# Patient Record
Sex: FEMALE | Race: WHITE | URBAN - METROPOLITAN AREA
[De-identification: names, ages, dates, MRNs, and addresses within clinical notes are randomized per-mention and may not be internally consistent; named-entity substitution may affect disease eponyms.]

---

## 2017-01-13 NOTE — PROGRESS NOTES
SUBJECTIVE:                                                    Teresita Hutton is a 31 year old female who presents to clinic today for the following health issues:      Acute Illness   Acute illness concerns: cough    Onset: 7 days     Fever: YES    Chills/Sweats: no    Headache (location?): YES    Sinus Pressure:YES    Conjunctivitis:  no    Ear Pain: Ear pops when blowing nose    Rhinorrhea: no    Congestion: yes    Sore Throat: YES     Cough: YES    Wheeze: Yes    Decreased Appetite: no    Nausea: no    Vomiting: no    Diarrhea:  no    Dysuria/Freq.: no    Fatigue/Achiness: YES    Sick/Strep Exposure: YES     Therapies Tried and outcome: [facility] rapid strep negative on 1/14/17      -------------------------------------    Problem list and histories reviewed & adjusted, as indicated.  Additional history: as documented    Problem list, Medication list, Allergies, and Medical/Social/Surgical histories reviewed in EPIC and updated as appropriate.    ROS:  As above    OBJECTIVE:                                                    /66 mmHg  Pulse 98  Temp(Src) 98  F (36.7  C) (Temporal)  Resp 16  Wt 186 lb (84.369 kg)  SpO2 98%  LMP 09/03/2016 (Exact Date)  Body mass index is 32.96 kg/(m^2).   GENERAL: healthy, alert, well nourished, well hydrated, no distress  HENT: ear canals- normal; TMs- normal; Nose- normal; Mouth- no ulcers, no lesions  NECK: no tenderness, no adenopathy, no asymmetry, no masses, no stiffness; thyroid- normal to palpation  RESP: lungs clear to auscultation - no rales, no rhonchi, no wheezes  CV: regular rates and rhythm, normal S1 S2, no S3 or S4 and no murmur, no click or rub -  ABDOMEN: soft, no tenderness, no  hepatosplenomegaly, no masses, normal bowel sounds         ASSESSMENT/PLAN:                                                        ICD-10-CM    1. Encounter for supervision of other normal pregnancy in second trimester Z34.82 ABO/Rh type and screen     Anti  Treponema     Glucose tolerance gest screen 1 hour     OB hemoglobin   2. Rh negative status in sears pregnancy in second trimester O09.892 ABO/Rh type and screen     Anti Treponema     Glucose tolerance gest screen 1 hour     OB hemoglobin       Patient has URI, reassured and instructed to minimize meds.  OK to use sudafed as needed.  Also benadryl often used for sleep as well.    Nikki Castro MD, MD  [facility]      24 Week Pre-Term Labor Questionnaire    Roomer to gather from chart:  1.  Is the patient s most recent hemoglobin <11.5? No    2.  Did the patient receive pre-nikia care prior to 20 weeks? Yes:   3.  Has the patient been seen at least once in the past 6 weeks? Yes:     Roomer to ask patient:  4.  Are you carrying twins? No  5.  Have you been in a major accident or suffered serious abdominal injury? No  6.  Have you been to the dentist? If so, are you being treated for Periodontal disease (gum disease)? No  7.  Have you experienced vaginal bleeding after 20 weeks? No  8.  Have you been treated for >1 UTIs during this pregnancy? No  9.  Have you been treated for Chlamydia or gonorrhea at any time during this pregnancy? No  10. During this pregnancy, have you ever been treated for depression  bi-polar disorder, anxiety, schizophrenia? No  11. Do you use of tobacco products? No  12. Do you drink beer, wine or hard liquor? No  13. Do you currently use any prescription narcotics, marijuana, speed, cocaine, heroin, hallucinogens or other drugs? No  14. Has anyone hit, slapped, kicked or otherwise hurt you? No    Provider to complete:  15. Has anyone forced you to have sex when you didn t want to? No  16. Does the patient have polyhydramnios (this pregnancy)? No  17. Is the patient s cervix dilated >2 cm or >80% effaced? No  18. Has the patient had threatened pre-term labor? No    Summary:  Patient is not high risk for  Labor

## 2017-01-18 ENCOUNTER — MYC MEDICAL ADVICE (OUTPATIENT)
Dept: FAMILY MEDICINE | Facility: OTHER | Age: 32
End: 2017-01-18

## 2017-01-19 ENCOUNTER — RADIANT APPOINTMENT (OUTPATIENT)
Dept: ULTRASOUND IMAGING | Facility: OTHER | Age: 32
End: 2017-01-19
Attending: FAMILY MEDICINE
Payer: COMMERCIAL

## 2017-01-19 ENCOUNTER — PRENATAL OFFICE VISIT (OUTPATIENT)
Dept: FAMILY MEDICINE | Facility: OTHER | Age: 32
End: 2017-01-19
Payer: COMMERCIAL

## 2017-01-19 VITALS
RESPIRATION RATE: 16 BRPM | OXYGEN SATURATION: 98 % | BODY MASS INDEX: 32.96 KG/M2 | SYSTOLIC BLOOD PRESSURE: 112 MMHG | HEART RATE: 98 BPM | WEIGHT: 186 LBS | DIASTOLIC BLOOD PRESSURE: 66 MMHG | TEMPERATURE: 98 F

## 2017-01-19 DIAGNOSIS — Z67.91 RH NEGATIVE STATUS IN SINGLETON PREGNANCY IN SECOND TRIMESTER: ICD-10-CM

## 2017-01-19 DIAGNOSIS — Z34.81 ENCOUNTER FOR SUPERVISION OF OTHER NORMAL PREGNANCY IN FIRST TRIMESTER: ICD-10-CM

## 2017-01-19 DIAGNOSIS — O26.892 RH NEGATIVE STATUS IN SINGLETON PREGNANCY IN SECOND TRIMESTER: ICD-10-CM

## 2017-01-19 DIAGNOSIS — Z34.82 ENCOUNTER FOR SUPERVISION OF OTHER NORMAL PREGNANCY IN SECOND TRIMESTER: Primary | ICD-10-CM

## 2017-01-19 PROCEDURE — 76805 OB US >/= 14 WKS SNGL FETUS: CPT

## 2017-01-19 PROCEDURE — 99207 ZZC COMPLICATED OB VISIT: CPT | Performed by: FAMILY MEDICINE

## 2017-01-19 ASSESSMENT — PAIN SCALES - GENERAL: PAINLEVEL: NO PAIN (0)

## 2017-01-19 NOTE — PROGRESS NOTES
Illness Concerns -- see other note  Good fetal movement.  US after visit today.  RTC 4 weeks.  Nikki Castro MD

## 2017-01-20 NOTE — PROGRESS NOTES
Quick Note:    Teresita, your US looks good. Though a few pictures weren't as good as the radiologist likes, they all looked good.  Please let me know if you have any questions.   Nikki Castro MD      ______

## 2017-02-16 ENCOUNTER — PRENATAL OFFICE VISIT (OUTPATIENT)
Dept: FAMILY MEDICINE | Facility: OTHER | Age: 32
End: 2017-02-16
Payer: COMMERCIAL

## 2017-02-16 VITALS
WEIGHT: 196 LBS | BODY MASS INDEX: 34.72 KG/M2 | SYSTOLIC BLOOD PRESSURE: 108 MMHG | OXYGEN SATURATION: 16 % | HEART RATE: 84 BPM | TEMPERATURE: 98.4 F | DIASTOLIC BLOOD PRESSURE: 66 MMHG

## 2017-02-16 DIAGNOSIS — Z67.91 RH NEGATIVE STATUS IN SINGLETON PREGNANCY IN SECOND TRIMESTER: ICD-10-CM

## 2017-02-16 DIAGNOSIS — Z34.82 ENCOUNTER FOR SUPERVISION OF OTHER NORMAL PREGNANCY IN SECOND TRIMESTER: ICD-10-CM

## 2017-02-16 DIAGNOSIS — O26.892 RH NEGATIVE STATUS IN SINGLETON PREGNANCY IN SECOND TRIMESTER: ICD-10-CM

## 2017-02-16 LAB
ABO + RH BLD: NORMAL
ABO + RH BLD: NORMAL
BLD GP AB SCN SERPL QL: NORMAL
BLOOD BANK CMNT PATIENT-IMP: NORMAL
GLUCOSE 1H P 50 G GLC PO SERPL-MCNC: 97 MG/DL (ref 60–129)
HGB BLD-MCNC: 11.7 G/DL (ref 11.7–15.7)
SPECIMEN EXP DATE BLD: NORMAL

## 2017-02-16 PROCEDURE — 00000218 ZZHCL STATISTIC OBHBG - HEMOGLOBIN: Performed by: FAMILY MEDICINE

## 2017-02-16 PROCEDURE — 86900 BLOOD TYPING SEROLOGIC ABO: CPT | Performed by: FAMILY MEDICINE

## 2017-02-16 PROCEDURE — 36415 COLL VENOUS BLD VENIPUNCTURE: CPT | Performed by: FAMILY MEDICINE

## 2017-02-16 PROCEDURE — 99207 ZZC PRENATAL VISIT: CPT | Performed by: FAMILY MEDICINE

## 2017-02-16 PROCEDURE — 86901 BLOOD TYPING SEROLOGIC RH(D): CPT | Performed by: FAMILY MEDICINE

## 2017-02-16 PROCEDURE — 86780 TREPONEMA PALLIDUM: CPT | Performed by: FAMILY MEDICINE

## 2017-02-16 PROCEDURE — 86850 RBC ANTIBODY SCREEN: CPT | Performed by: FAMILY MEDICINE

## 2017-02-16 PROCEDURE — 82950 GLUCOSE TEST: CPT | Performed by: FAMILY MEDICINE

## 2017-02-16 ASSESSMENT — PAIN SCALES - GENERAL: PAINLEVEL: NO PAIN (0)

## 2017-02-16 NOTE — PROGRESS NOTES
Teresita, your results were all normal so far.  Please let me know if you have any questions.    Nikki Castro MD

## 2017-02-16 NOTE — PROGRESS NOTES
no Concerns  Good fetal movement.  Normal anatomy ultrasound.  RTC 4 weeks.  GTT today  Nikki Castro MD

## 2017-02-17 LAB — T PALLIDUM IGG+IGM SER QL: NEGATIVE

## 2017-02-17 ASSESSMENT — PATIENT HEALTH QUESTIONNAIRE - PHQ9: SUM OF ALL RESPONSES TO PHQ QUESTIONS 1-9: 0

## 2017-02-18 NOTE — PROGRESS NOTES
Teresita, your results were all normal.    Please let me know if you have any questions.    Nikki Castro MD

## 2017-03-16 ENCOUNTER — PRENATAL OFFICE VISIT (OUTPATIENT)
Dept: FAMILY MEDICINE | Facility: OTHER | Age: 32
End: 2017-03-16
Payer: COMMERCIAL

## 2017-03-16 VITALS
WEIGHT: 201 LBS | SYSTOLIC BLOOD PRESSURE: 120 MMHG | TEMPERATURE: 98.1 F | HEART RATE: 84 BPM | DIASTOLIC BLOOD PRESSURE: 80 MMHG | BODY MASS INDEX: 35.61 KG/M2

## 2017-03-16 DIAGNOSIS — Z34.83 ENCOUNTER FOR SUPERVISION OF OTHER NORMAL PREGNANCY IN THIRD TRIMESTER: Primary | ICD-10-CM

## 2017-03-16 DIAGNOSIS — O26.892 RH NEGATIVE STATUS IN SINGLETON PREGNANCY IN SECOND TRIMESTER: ICD-10-CM

## 2017-03-16 DIAGNOSIS — Z67.91 RH NEGATIVE STATUS IN SINGLETON PREGNANCY IN SECOND TRIMESTER: ICD-10-CM

## 2017-03-16 PROCEDURE — 96372 THER/PROPH/DIAG INJ SC/IM: CPT | Performed by: FAMILY MEDICINE

## 2017-03-16 PROCEDURE — 90471 IMMUNIZATION ADMIN: CPT | Performed by: FAMILY MEDICINE

## 2017-03-16 PROCEDURE — 99207 ZZC PRENATAL VISIT: CPT | Performed by: FAMILY MEDICINE

## 2017-03-16 PROCEDURE — 90715 TDAP VACCINE 7 YRS/> IM: CPT | Performed by: FAMILY MEDICINE

## 2017-03-16 ASSESSMENT — PAIN SCALES - GENERAL: PAINLEVEL: NO PAIN (0)

## 2017-03-16 NOTE — PROGRESS NOTES
Feeling well.  Rhogam needed  Tdap today   Good fetal movement  No VB, Loss of fluid, contractions  RTC in 2-3 weeks.  Nikki Castro MD

## 2017-03-16 NOTE — MR AVS SNAPSHOT
After Visit Summary   3/16/2017    Teresita Hutton    MRN: [id number]           Patient Information     Date Of Birth          1985        Visit Information        Provider Department      3/16/2017 8:00 AM Nikki Castro MD [facility]         Follow-ups after your visit        Your next 10 appointments already scheduled     Apr 03, 2017  8:00 AM CDT   ESTABL[facility] PRENATAL with Nikki Castro MD   [facility]    [address] [facility] [address]  [facility] [address]   [phone]            Apr 17, 2017  8:30 AM CDT   ESTABLISHED PRENATAL with Nikki Castro MD   [facility]    [address] [facility] [address]  [facility] [address]   [phone]            May 01, 2017  8:00 AM CDT   ESTABLISHED PRENATAL with Nikki Castro MD   [facility]    [address] [facility] [address]  [facility] [address]   [phone]            May 15, 2017  8:00 AM CDT   ESTABLISHED PRENATAL with Nikki Castro MD   [facility]    [address] [address]   [phone]              Who to contact     If you have questions or need follow up information about today's clinic visit or your schedule please contact [facility] directly at [phone].  Normal or non-critical lab and imaging results will be communicated to you by MyChart, letter or phone within 4 business days after the clinic has received the results. If you do not hear from us within 7 days, please contact the clinic through [employer] or phone. If you have a critical or abnormal lab result, we will notify you by phone as soon as possible.  Submit refill requests through "[employer]" or call your pharmacy and they will forward the refill request to us. Please allow 3 business days for your refill to be completed.          Additional  Information About Your Visit        [employer] Information     [employer] gives you secure access to your electronic health record. If you see a primary care provider, you can also send messages to your care team and make appointments. If you have questions, please call your primary care clinic.  If you do not have a primary care provider, please call [phone] and they will assist you.        Care EveryWhere ID     This is your Care EveryWhere ID. This could be used by other organizations to access your [place] medical records  [id number]        Your Vitals Were     Pulse Temperature Last Period BMI (Body Mass Index)          84 98.1  F (36.7  C) (Temporal) 09/03/2016 (Exact Date) 35.61 kg/m2         Blood Pressure from Last 3 Encounters:   03/16/17 120/80   02/16/17 108/66   01/19/17 112/66    Weight from Last 3 Encounters:   03/16/17 201 lb (91.2 kg)   02/16/17 196 lb (88.9 kg)   01/19/17 186 lb (84.4 kg)              Today, you had the following     No orders found for display       Primary Care Provider Office Phone # Fax #    Nikki Aileen Castro [phone]       [facility]  [address] [facility] [address]        Thank you!     Thank you for choosing [facility]  for your care. Our goal is always to provide you with excellent care. Hearing back from our patients is one way we can continue to improve our services. Please take a few minutes to complete the written survey that you may receive in the mail after your visit with us. Thank you!             Your Updated Medication List - Protect others around you: Learn how to safely use, store and throw away your medicines at www.disposemymeds.org.          This list is accurate as of: 3/16/17  8:22 AM.  Always use your most recent med list.                   Brand Name Dispense Instructions for use    PRENATAL 1 PO      Take 1 tablet by mouth daily

## 2017-04-03 ENCOUNTER — PRENATAL OFFICE VISIT (OUTPATIENT)
Dept: FAMILY MEDICINE | Facility: OTHER | Age: 32
End: 2017-04-03
Payer: COMMERCIAL

## 2017-04-03 VITALS
BODY MASS INDEX: 36.49 KG/M2 | HEART RATE: 80 BPM | TEMPERATURE: 98.7 F | SYSTOLIC BLOOD PRESSURE: 112 MMHG | DIASTOLIC BLOOD PRESSURE: 74 MMHG | RESPIRATION RATE: 16 BRPM | WEIGHT: 206 LBS

## 2017-04-03 DIAGNOSIS — Z34.82 ENCOUNTER FOR SUPERVISION OF OTHER NORMAL PREGNANCY IN SECOND TRIMESTER: Primary | ICD-10-CM

## 2017-04-03 PROCEDURE — 99207 ZZC PRENATAL VISIT: CPT | Performed by: FAMILY MEDICINE

## 2017-04-03 ASSESSMENT — ANXIETY QUESTIONNAIRES
GAD7 TOTAL SCORE: 0
5. BEING SO RESTLESS THAT IT IS HARD TO SIT STILL: NOT AT ALL
IF YOU CHECKED OFF ANY PROBLEMS ON THIS QUESTIONNAIRE, HOW DIFFICULT HAVE THESE PROBLEMS MADE IT FOR YOU TO DO YOUR WORK, TAKE CARE OF THINGS AT HOME, OR GET ALONG WITH OTHER PEOPLE: NOT DIFFICULT AT ALL
2. NOT BEING ABLE TO STOP OR CONTROL WORRYING: NOT AT ALL
3. WORRYING TOO MUCH ABOUT DIFFERENT THINGS: NOT AT ALL
1. FEELING NERVOUS, ANXIOUS, OR ON EDGE: NOT AT ALL
7. FEELING AFRAID AS IF SOMETHING AWFUL MIGHT HAPPEN: NOT AT ALL
6. BECOMING EASILY ANNOYED OR IRRITABLE: NOT AT ALL

## 2017-04-03 ASSESSMENT — PATIENT HEALTH QUESTIONNAIRE - PHQ9: 5. POOR APPETITE OR OVEREATING: NOT AT ALL

## 2017-04-03 ASSESSMENT — PAIN SCALES - GENERAL: PAINLEVEL: NO PAIN (0)

## 2017-04-03 NOTE — MR AVS SNAPSHOT
After Visit Summary   4/3/2017    Teresita Hutton    MRN: [id number]           Patient Information     Date Of Birth          1985        Visit Information        Provider Department      4/3/2017 8:00 AM Nikki Castro MD [facility]        Today's Diagnoses     Encounter for supervision of other normal pregnancy in second trimester    -  1       Follow-ups after your visit        Your next 10 appointments already scheduled     Apr 17, 2017  8:30 AM CDT   ESTABLISHED PRENATAL with Nikki Castro MD   [facility]    [address] [address]   [phone]            May 01, 2017  8:00 AM CDT   ESTABLISHED PRENATAL with Nikki Castro MD   [facility]    [address] [address]   [phone]            May 15, 2017  8:00 AM CDT   ESTABLISHED PRENATAL with Nikki Castro MD   [facility]    [address] [address]   [phone]            May 22, 2017  8:00 AM CDT   ESTABLISHED PRENATAL with Nikki Castro MD   [facility]    [address] [address]   [phone]            May 31, 2017  8:30 AM CDT   ESTABLISHED PRENATAL with Nikki Castro MD   [facility]    [address] [address]   [phone]            Jun 12, 2017  8:00 AM CDT   ESTABLISHED PRENATAL with Nikki Castro MD   [facility]    [address] [address]   [phone]              Who to contact     If you have questions or need follow up information about today's clinic visit or your schedule please contact [facility] directly at [phone].  Normal or non-critical lab and  imaging results will be communicated to you by MyChart, letter or phone within 4 business days after the clinic has received the results. If you do not hear from us within 7 days, please contact the clinic through [employer] or phone. If you have a critical or abnormal lab result, we will notify you by phone as soon as possible.  Submit refill requests through [employer] or call your pharmacy and they will forward the refill request to us. Please allow 3 business days for your refill to be completed.          Additional Information About Your Visit        [employer] Information     [employer] gives you secure access to your electronic health record. If you see a primary care provider, you can also send messages to your care team and make appointments. If you have questions, please call your primary care clinic.  If you do not have a primary care provider, please call [phone] and they will assist you.        Care EveryWhere ID     This is your Care EveryWhere ID. This could be used by other organizations to access your [place] medical records  [id number]        Your Vitals Were     Pulse Temperature Respirations Last Period BMI (Body Mass Index)       80 98.7  F (37.1  C) (Temporal) 16 09/03/2016 (Exact Date) 36.49 kg/m2        Blood Pressure from Last 3 Encounters:   04/03/17 112/74   03/16/17 120/80   02/16/17 108/66    Weight from Last 3 Encounters:   04/03/17 206 lb (93.4 kg)   03/16/17 201 lb (91.2 kg)   02/16/17 196 lb (88.9 kg)              Today, you had the following     No orders found for display       Primary Care Provider Office Phone # Fax #    Nikki Castro [phone]       [facility]  [address] [facility] [address]        Thank you!     Thank you for choosing [facility]  for your care. Our goal is always to provide you with excellent care. Hearing back from our patients is one way we can continue to improve our services. Please take a few minutes to  complete the written survey that you may receive in the mail after your visit with us. Thank you!             Your Updated Medication List - Protect others around you: Learn how to safely use, store and throw away your medicines at www.disposemymeds.org.          This list is accurate as of: 4/3/17  8:27 AM.  Always use your most recent med list.                   Brand Name Dispense Instructions for use    PRENATAL 1 PO      Take 1 tablet by mouth daily

## 2017-04-03 NOTE — NURSING NOTE
"Chief Complaint   Patient presents with     Prenatal Care     30 weeks 2 days     Panel Management       Initial /74 (BP Location: Right arm, Patient Position: Chair, Cuff Size: Adult Regular)  Pulse 80  Temp 98.7  F (37.1  C) (Temporal)  Resp 16  Wt 206 lb (93.4 kg)  LMP 09/03/2016 (Exact Date)  BMI 36.49 kg/m2 Estimated body mass index is 36.49 kg/(m^2) as calculated from the following:    Height as of 11/17/16: 5' 3\" (1.6 m).    Weight as of this encounter: 206 lb (93.4 kg).  Medication Reconciliation: complete  "

## 2017-04-04 ASSESSMENT — PATIENT HEALTH QUESTIONNAIRE - PHQ9: SUM OF ALL RESPONSES TO PHQ QUESTIONS 1-9: 0

## 2017-04-04 ASSESSMENT — ANXIETY QUESTIONNAIRES: GAD7 TOTAL SCORE: 0

## 2017-04-17 ENCOUNTER — PRENATAL OFFICE VISIT (OUTPATIENT)
Dept: FAMILY MEDICINE | Facility: OTHER | Age: 32
End: 2017-04-17
Payer: COMMERCIAL

## 2017-04-17 VITALS
BODY MASS INDEX: 37.56 KG/M2 | HEART RATE: 68 BPM | TEMPERATURE: 98.7 F | WEIGHT: 212 LBS | HEIGHT: 63 IN | DIASTOLIC BLOOD PRESSURE: 80 MMHG | SYSTOLIC BLOOD PRESSURE: 120 MMHG | RESPIRATION RATE: 18 BRPM

## 2017-04-17 DIAGNOSIS — O26.892 RH NEGATIVE STATUS IN SINGLETON PREGNANCY IN SECOND TRIMESTER: ICD-10-CM

## 2017-04-17 DIAGNOSIS — Z34.82 ENCOUNTER FOR SUPERVISION OF OTHER NORMAL PREGNANCY IN SECOND TRIMESTER: Primary | ICD-10-CM

## 2017-04-17 DIAGNOSIS — Z67.91 RH NEGATIVE STATUS IN SINGLETON PREGNANCY IN SECOND TRIMESTER: ICD-10-CM

## 2017-04-17 PROCEDURE — 99207 ZZC PRENATAL VISIT: CPT | Performed by: FAMILY MEDICINE

## 2017-04-17 ASSESSMENT — PAIN SCALES - GENERAL: PAINLEVEL: MILD PAIN (2)

## 2017-04-17 ASSESSMENT — PATIENT HEALTH QUESTIONNAIRE - PHQ9
SUM OF ALL RESPONSES TO PHQ QUESTIONS 1-9: 0
10. IF YOU CHECKED OFF ANY PROBLEMS, HOW DIFFICULT HAVE THESE PROBLEMS MADE IT FOR YOU TO DO YOUR WORK, TAKE CARE OF THINGS AT HOME, OR GET ALONG WITH OTHER PEOPLE: NOT DIFFICULT AT ALL

## 2017-04-17 NOTE — LETTER
[facility]  [address]  [facility] [address]  [phone]          April 17, 2017      Teresita Hutton   1985  [address] [facility] [address]            To whom it may concern,    Above named patient is being referred to [facility] for delivery due to convenience in labor.    Sincerely,        Nikki Castro MD

## 2017-04-17 NOTE — NURSING NOTE
"Chief Complaint   Patient presents with     Prenatal Care     32 weeks 2 days       Initial /80 (BP Location: Left arm, Patient Position: Chair, Cuff Size: Adult Regular)  Pulse 68  Temp 98.7  F (37.1  C) (Temporal)  Resp 18  Ht 5' 3\" (1.6 m)  Wt 212 lb (96.2 kg)  LMP 09/03/2016 (Exact Date)  BMI 37.55 kg/m2 Estimated body mass index is 37.55 kg/(m^2) as calculated from the following:    Height as of this encounter: 5' 3\" (1.6 m).    Weight as of this encounter: 212 lb (96.2 kg).  Medication Reconciliation: complete  "

## 2017-04-17 NOTE — MR AVS SNAPSHOT
After Visit Summary   4/17/2017    Teresita Hutton    MRN: [id number]           Patient Information     Date Of Birth          1985        Visit Information        Provider Department      4/17/2017 8:30 AM Nikki Castro MD [facility]        Today's Diagnoses     Encounter for supervision of other normal pregnancy in second trimester    -  1    Rh negative status in sears pregnancy in second trimester           Follow-ups after your visit        Your next 10 appointments already scheduled     May 01, 2017  8:00 AM CDT   ESTABLISHED PRENATAL with Nikki Castro MD   [facility] ([facility])    [address] [address]   [phone]            May 15, 2017  8:00 AM CDT   ESTABLISHED PRENATAL with Nikki Castro MD   [facility] ([facility])    [address] [address]   [phone]            May 22, 2017  8:00 AM CDT   ESTABLISHED PRENATAL with Nikki Castro MD   [facility] ([facility])    [address] [address]   [phone]            May 31, 2017  8:30 AM CDT   ESTABLISHED PRENATAL with Nikki Castro MD   [facility] ([facility])    [address] [address]   [phone]            Jun 12, 2017  8:00 AM CDT   ESTABLISHED PRENATAL with Nikki Castro MD   [facility] ([facility])    [address] [address]   [phone]              Who to contact     If you have questions or need follow up information about today's clinic visit or your schedule please contact [facility] directly at [phone].  Normal or non-critical lab and imaging results will be communicated to you by MyChart, letter or phone within 4 business days after the clinic has received the results. If  "you do not hear from us within 7 days, please contact the clinic through [employer] or phone. If you have a critical or abnormal lab result, we will notify you by phone as soon as possible.  Submit refill requests through [employer] or call your pharmacy and they will forward the refill request to us. Please allow 3 business days for your refill to be completed.          Additional Information About Your Visit        [employer] Information     [employer] gives you secure access to your electronic health record. If you see a primary care provider, you can also send messages to your care team and make appointments. If you have questions, please call your primary care clinic.  If you do not have a primary care provider, please call [phone] and they will assist you.        Care EveryWhere ID     This is your Care EveryWhere ID. This could be used by other organizations to access your [place] medical records  [id number]        Your Vitals Were     Pulse Temperature Respirations Height Last Period BMI (Body Mass Index)    68 98.7  F (37.1  C) (Temporal) 18 5' 3\" (1.6 m) 09/03/2016 (Exact Date) 37.55 kg/m2       Blood Pressure from Last 3 Encounters:   04/17/17 120/80   04/03/17 112/74   03/16/17 120/80    Weight from Last 3 Encounters:   04/17/17 212 lb (96.2 kg)   04/03/17 206 lb (93.4 kg)   03/16/17 201 lb (91.2 kg)              Today, you had the following     No orders found for display         Today's Medication Changes          These changes are accurate as of: 4/17/17  9:20 AM.  If you have any questions, ask your nurse or doctor.               Start taking these medicines.        Dose/Directions    order for DME   Used for:  Encounter for supervision of other normal pregnancy in second trimester, Rh negative status in sears pregnancy in second trimester   Started by:  Nikki Castro MD        Breast pump   Quantity:  1 each   Refills:  0            Where to get your medicines      Some of these will need a " paper prescription and others can be bought over the counter.  Ask your nurse if you have questions.     Bring a paper prescription for each of these medications     order for DME                Primary Care Provider Office Phone # Fax #    Nikki Aileen Castro [phone]       [facility]  [address] [facility] [address]        Thank you!     Thank you for choosing [facility]  for your care. Our goal is always to provide you with excellent care. Hearing back from our patients is one way we can continue to improve our services. Please take a few minutes to complete the written survey that you may receive in the mail after your visit with us. Thank you!             Your Updated Medication List - Protect others around you: Learn how to safely use, store and throw away your medicines at www.disposemymeds.org.          This list is accurate as of: 4/17/17  9:20 AM.  Always use your most recent med list.                   Brand Name Dispense Instructions for use    order for DME     1 each    Breast pump       PRENATAL 1 PO      Take 1 tablet by mouth daily

## 2017-04-17 NOTE — PROGRESS NOTES
Feeling well.  Good fetal movement.  No VB, LOF.  No contractions.  Discussed labor precautions.  Due to convenience, patient [place] [facility]  Offered letter of referral as I am not familiar with process for this.  Insurance states she needs this if she delivers there.  Also breast pump addressed today and reviewed process for circ in clinic as she desires this.  RTC 2 weeks.  Nikki Castro MD     Answers for HPI/ROS submitted by the patient on 4/17/2017   If you checked off any problems, how difficult have these problems made it for you to do your work, take care of things at home, or get along with other people?: Not difficult at all  PHQ9 TOTAL SCORE: 0

## 2017-04-18 ASSESSMENT — PATIENT HEALTH QUESTIONNAIRE - PHQ9: SUM OF ALL RESPONSES TO PHQ QUESTIONS 1-9: 0

## 2017-05-01 ENCOUNTER — PRENATAL OFFICE VISIT (OUTPATIENT)
Dept: FAMILY MEDICINE | Facility: OTHER | Age: 32
End: 2017-05-01
Payer: COMMERCIAL

## 2017-05-01 VITALS
RESPIRATION RATE: 16 BRPM | TEMPERATURE: 97.6 F | DIASTOLIC BLOOD PRESSURE: 72 MMHG | BODY MASS INDEX: 38.26 KG/M2 | SYSTOLIC BLOOD PRESSURE: 124 MMHG | WEIGHT: 216 LBS | HEART RATE: 80 BPM

## 2017-05-01 DIAGNOSIS — Z34.82 ENCOUNTER FOR SUPERVISION OF OTHER NORMAL PREGNANCY IN SECOND TRIMESTER: Primary | ICD-10-CM

## 2017-05-01 PROCEDURE — 99207 ZZC PRENATAL VISIT: CPT | Performed by: FAMILY MEDICINE

## 2017-05-01 ASSESSMENT — PATIENT HEALTH QUESTIONNAIRE - PHQ9
10. IF YOU CHECKED OFF ANY PROBLEMS, HOW DIFFICULT HAVE THESE PROBLEMS MADE IT FOR YOU TO DO YOUR WORK, TAKE CARE OF THINGS AT HOME, OR GET ALONG WITH OTHER PEOPLE: NOT DIFFICULT AT ALL
SUM OF ALL RESPONSES TO PHQ QUESTIONS 1-9: 0

## 2017-05-01 ASSESSMENT — PAIN SCALES - GENERAL: PAINLEVEL: NO PAIN (0)

## 2017-05-01 NOTE — LETTER
My Depression Action Plan  Name: Teresita Hutton   Date of Birth 1985  Date: 4/21/2017    My doctor: Nikki Castro   My clinic: [address]  [place] [address] [phone]          GREEN    ZONE   Good Control    What it looks like:     Things are going generally well. You have normal up s and down s. You may even feel depressed from time to time, but bad moods usually last less than a day.   What you need to do:  1. Continue to care for yourself (see self care plan)  2. Check your depression survival kit and update it as needed  3. Follow your physician s recommendations including any medication.  4. Do not stop taking medication unless you consult with your physician first.           YELLOW         ZONE Getting Worse    What it looks like:     Depression is starting to interfere with your life.     It may be hard to get out of bed; you may be starting to isolate yourself from others.    Symptoms of depression are starting to last most all day and this has happened for several days.     You may have suicidal thoughts but they are not constant.   What you need to do:     1. Call your care team, your response to treatment will improve if you keep your care team informed of your progress. Yellow periods are signs an adjustment may need to be made.     2. Continue your self-care, even if you have to fake it!    3. Talk to someone in your support network    4. Open up your depression survival kit           RED    ZONE Medical Alert - Get Help    What it looks like:     Depression is seriously interfering with your life.     You may experience these or other symptoms: You can t get out of bed most days, can t work or engage in other necessary activities, you have trouble taking care of basic hygiene, or basic responsibilities, thoughts of suicide or death that will not go away, self-injurious behavior.     What you need to do:  1. Call your care team  and request a same-day appointment. If they are not available (weekends or after hours) call your local crisis line, emergency room or 911.      Electronically signed by: Luz Hough, April 21, 2017    Depression Self Care Plan / Survival Kit    Self-Care for Depression  Here s the deal. Your body and mind are really not as separate as most people think.  What you do and think affects how you feel and how you feel influences what you do and think. This means if you do things that people who feel good do, it will help you feel better.  Sometimes this is all it takes.  There is also a place for medication and therapy depending on how severe your depression is, so be sure to consult with your medical provider and/ or Behavioral Health Consultant if your symptoms are worsening or not improving.     In order to better manage my stress, I will:    Exercise  Get some form of exercise, every day. This will help reduce pain and release endorphins, the  feel good  chemicals in your brain. This is almost as good as taking antidepressants!  This is not the same as joining a gym and then never going! (they count on that by the way ) It can be as simple as just going for a walk or doing some gardening, anything that will get you moving.      Hygiene   Maintain good hygiene (Get out of bed in the morning, Make your bed, Brush your teeth, Take a shower, and Get dressed like you were going to work, even if you are unemployed).  If your clothes don't fit try to get ones that do.    Diet  I will strive to eat foods that are good for me, drink plenty of water, and avoid excessive sugar, caffeine, alcohol, and other mood-altering substances.  Some foods that are helpful in depression are: complex carbohydrates, B vitamins, flaxseed, fish or fish oil, fresh fruits and vegetables.    Psychotherapy  I agree to participate in Individual Therapy (if recommended).    Medication  If prescribed medications, I agree to take them.   Missing doses can result in serious side effects.  I understand that drinking alcohol, or other illicit drug use, may cause potential side effects.  I will not stop my medication abruptly without first discussing it with my provider.    Staying Connected With Others  I will stay in touch with my friends, family members, and my primary care provider/team.    Use your imagination  Be creative.  We all have a creative side; it doesn t matter if it s oil painting, sand castles, or mud pies! This will also kick up the endorphins.    Witness Beauty  (AKA stop and smell the roses) Take a look outside, even in mid-winter. Notice colors, textures. Watch the squirrels and birds.     Service to others  Be of service to others.  There is always someone else in need.  By helping others we can  get out of ourselves  and remember the really important things.  This also provides opportunities for practicing all the other parts of the program.    Humor  Laugh and be silly!  Adjust your TV habits for less news and crime-drama and more comedy.    Control your stress  Try breathing deep, massage therapy, biofeedback, and meditation. Find time to relax each day.     My support system    Clinic Contact:  Phone number:    Contact 1:  Phone number:    Contact 2:  Phone number:    Sabian[employer]/:  Phone number:    Therapist:  Phone number:    Local crisis center:    Phone number:    Other community support:  Phone number:

## 2017-05-01 NOTE — PROGRESS NOTES
Back to having insurance concerns -- some of the hospital stay is possibly not covered for [facility].  Will let me know what I can do to help, but may have to deliver at [facility].  Has increase in swelling in her ankles bilaterally.    Feeling well.    Good fetal movement.  No VB, LOF, contractions.    No HA, RUQ pain, N/V, visual changes.  Labor precautions discussed.  RTC 2 week.  Nikki Castro MD

## 2017-05-01 NOTE — NURSING NOTE
"Chief Complaint   Patient presents with     Prenatal Care     34 weeks 2 days, concern-swelling in feet       Initial /72  Pulse 80  Temp 97.6  F (36.4  C) (Temporal)  Resp 16  Wt 216 lb (98 kg)  LMP 09/03/2016 (Exact Date)  BMI 38.26 kg/m2 Estimated body mass index is 38.26 kg/(m^2) as calculated from the following:    Height as of 4/17/17: 5' 3\" (1.6 m).    Weight as of this encounter: 216 lb (98 kg).  Medication Reconciliation: complete   Anca Littlejohn CMA    "

## 2017-05-01 NOTE — MR AVS SNAPSHOT
After Visit Summary   5/1/2017    Teresita Hutton    MRN: [id number]           Patient Information     Date Of Birth          1985        Visit Information        Provider Department      5/1/2017 8:00 AM Nikki Castro MD [facility]        Today's Diagnoses     Encounter for supervision of other normal pregnancy in second trimester    -  1       Follow-ups after your visit        Your next 10 appointments already scheduled     May 15, 2017  8:00 AM CDT   ESTABLISHED PRENATAL with Nikki Castro MD   [facility]    [address] [address]   [phone]            May 22, 2017  8:00 AM CDT   ESTABLISHED PRENATAL with Nikki Castro MD   [facility]    [address] [address]   [phone]            May 31, 2017  8:30 AM CDT   ESTABLISHED PRENATAL with Nikki Castro MD   [facility]    [address] [address]   [phone]            Jun 12, 2017  8:00 AM CDT   ESTABLISHED PRENATAL with Nikki Castro MD   [facility]    [address] [address]   [phone]              Who to contact     If you have questions or need follow up information about today's clinic visit or your schedule please contact [facility] directly at [phone].  Normal or non-critical lab and imaging results will be communicated to you by MyChart, letter or phone within 4 business days after the clinic has received the results. If you do not hear from us within 7 days, please contact the clinic through Fab[employer]ent[employer]hart or phone. If you have a critical or abnormal lab result, we will notify you by phone as soon as possible.  Submit refill requests through [employer] or call your pharmacy and they will forward the refill  request to us. Please allow 3 business days for your refill to be completed.          Additional Information About Your Visit        MyChart Information     [email or web address] gives you secure access to your electronic health record. If you see a primary care provider, you can also send messages to your care team and make appointments. If you have questions, please call your primary care clinic.  If you do not have a primary care provider, please call [phone] and they will assist you.        Care EveryWhere ID     This is your Care EveryWhere ID. This could be used by other organizations to access your [place] medical records  [id number]        Your Vitals Were     Pulse Temperature Respirations Last Period BMI (Body Mass Index)       80 97.6  F (36.4  C) (Temporal) 16 09/03/2016 (Exact Date) 38.26 kg/m2        Blood Pressure from Last 3 Encounters:   05/01/17 124/72   04/17/17 120/80   04/03/17 112/74    Weight from Last 3 Encounters:   05/01/17 216 lb (98 kg)   04/17/17 212 lb (96.2 kg)   04/03/17 206 lb (93.4 kg)              Today, you had the following     No orders found for display       Primary Care Provider Office Phone # Fax #    Nikki Aileen Castro [phone]       [address] [address]        Thank you!     Thank you for choosing [facility]  for your care. Our goal is always to provide you with excellent care. Hearing back from our patients is one way we can continue to improve our services. Please take a few minutes to complete the written survey that you may receive in the mail after your visit with us. Thank you!             Your Updated Medication List - Protect others around you: Learn how to safely use, store and throw away your medicines at www.disposemymeds.org.          This list is accurate as of: 5/1/17  8:36 AM.  Always use your most recent med list.                   Brand Name Dispense Instructions for use    order for DME      1 each    Breast pump       PRENATAL 1 PO      Take 1 tablet by mouth daily

## 2017-05-02 ASSESSMENT — PATIENT HEALTH QUESTIONNAIRE - PHQ9: SUM OF ALL RESPONSES TO PHQ QUESTIONS 1-9: 0

## 2017-05-15 ENCOUNTER — PRENATAL OFFICE VISIT (OUTPATIENT)
Dept: FAMILY MEDICINE | Facility: OTHER | Age: 32
End: 2017-05-15
Payer: COMMERCIAL

## 2017-05-15 VITALS
HEIGHT: 63 IN | BODY MASS INDEX: 39.69 KG/M2 | RESPIRATION RATE: 18 BRPM | SYSTOLIC BLOOD PRESSURE: 120 MMHG | TEMPERATURE: 98.7 F | WEIGHT: 224 LBS | DIASTOLIC BLOOD PRESSURE: 86 MMHG

## 2017-05-15 DIAGNOSIS — Z34.83 ENCOUNTER FOR SUPERVISION OF OTHER NORMAL PREGNANCY IN THIRD TRIMESTER: Primary | ICD-10-CM

## 2017-05-15 DIAGNOSIS — O26.893 RH NEGATIVE STATUS IN SINGLETON PREGNANCY IN THIRD TRIMESTER: ICD-10-CM

## 2017-05-15 DIAGNOSIS — Z67.91 RH NEGATIVE STATUS IN SINGLETON PREGNANCY IN THIRD TRIMESTER: ICD-10-CM

## 2017-05-15 DIAGNOSIS — F33.0 MAJOR DEPRESSIVE DISORDER, RECURRENT EPISODE, MILD (H): ICD-10-CM

## 2017-05-15 PROCEDURE — 87653 STREP B DNA AMP PROBE: CPT | Performed by: FAMILY MEDICINE

## 2017-05-15 PROCEDURE — 99207 ZZC PRENATAL VISIT: CPT | Performed by: FAMILY MEDICINE

## 2017-05-15 ASSESSMENT — PAIN SCALES - GENERAL: PAINLEVEL: MODERATE PAIN (5)

## 2017-05-15 NOTE — NURSING NOTE
"Chief Complaint   Patient presents with     Prenatal Care     36 weeks 2 days     Patient is noticing some swelling in feet and hands and pain in hips at night.    Initial /86 (BP Location: Right arm, Patient Position: Chair, Cuff Size: Adult Regular)  Temp 98.7  F (37.1  C) (Temporal)  Resp 18  Ht 5' 3\" (1.6 m)  Wt 224 lb (101.6 kg)  LMP 09/03/2016 (Exact Date)  BMI 39.68 kg/m2 Estimated body mass index is 39.68 kg/(m^2) as calculated from the following:    Height as of this encounter: 5' 3\" (1.6 m).    Weight as of this encounter: 224 lb (101.6 kg).  Medication Reconciliation: complete  "

## 2017-05-15 NOTE — MR AVS SNAPSHOT
After Visit Summary   5/15/2017    Teresita Hutton    MRN: [id number]           Patient Information     Date Of Birth          1985        Visit Information        Provider Department      5/15/2017 8:00 AM Nikki Castro MD [facility]        Today's Diagnoses     Encounter for supervision of other normal pregnancy in third trimester    -  1    Major depressive disorder, recurrent episode, mild (H)        Rh negative status in sears pregnancy in third trimester           Follow-ups after your visit        Your next 10 appointments already scheduled     May 22, 2017  8:00 AM CDT   ESTABLISHED PRENATAL with Nikki Castro MD   [facility]    [address] [address]   [phone]            May 31, 2017  8:30 AM CDT   ESTABLISHED PRENATAL with Nikki Castro MD   [facility] ([facility])    [address] [address]   [phone]            Jun 12, 2017  8:00 AM CDT   ESTABLISHED PRENATAL with Nikki Castro MD   [facility] ([facility])    [address] [address]   [phone]              Who to contact     If you have questions or need follow up information about today's clinic visit or your schedule please contact [facility] directly at [phone].  Normal or non-critical lab and imaging results will be communicated to you by MyChart, letter or phone within 4 business days after the clinic has received the results. If you do not hear from us within 7 days, please contact the clinic through MyChart or phone. If you have a critical or abnormal lab result, we will notify you by phone as soon as possible.  Submit refill requests through [employer] or call your pharmacy and they will forward the refill request to us. Please allow 3 business days for your refill to be completed.  "         Additional Information About Your Visit        MyChart Information     [employer] gives you secure access to your electronic health record. If you see a primary care provider, you can also send messages to your care team and make appointments. If you have questions, please call your primary care clinic.  If you do not have a primary care provider, please call [phone] and they will assist you.        Care EveryWhere ID     This is your Care EveryWhere ID. This could be used by other organizations to access your [place] medical records  [id number]        Your Vitals Were     Temperature Respirations Height Last Period BMI (Body Mass Index)       98.7  F (37.1  C) (Temporal) 18 5' 3\" (1.6 m) 09/03/2016 (Exact Date) 39.68 kg/m2        Blood Pressure from Last 3 Encounters:   05/15/17 120/86   05/01/17 124/72   04/17/17 120/80    Weight from Last 3 Encounters:   05/15/17 224 lb (101.6 kg)   05/01/17 216 lb (98 kg)   04/17/17 212 lb (96.2 kg)              We Performed the Following     Strep, Group B by PCR        Primary Care Provider Office Phone # Fax #    Nikki Castro [phone]       [facility]  [address] [facility] [address]        Thank you!     Thank you for choosing [facility]  for your care. Our goal is always to provide you with excellent care. Hearing back from our patients is one way we can continue to improve our services. Please take a few minutes to complete the written survey that you may receive in the mail after your visit with us. Thank you!             Your Updated Medication List - Protect others around you: Learn how to safely use, store and throw away your medicines at www.disposemymeds.org.          This list is accurate as of: 5/15/17  9:50 AM.  Always use your most recent med list.                   Brand Name Dispense Instructions for use    order for DME     1 each    Breast pump       PRENATAL 1 PO      Take 1 tablet by " mouth daily

## 2017-05-15 NOTE — PROGRESS NOTES
Feeling well.    Good fetal movement.  No VB, LOF, contractions.    No HA, RUQ pain, N/V, visual changes.  Labor precautions discussed.  RTC 1 week.   Discussed vaccines today and given info on Hep B at birth.  Nikki Castro MD

## 2017-05-16 LAB
GP B STREP DNA SPEC QL NAA+PROBE: NORMAL
SPECIMEN SOURCE: NORMAL

## 2017-05-22 ENCOUNTER — PRENATAL OFFICE VISIT (OUTPATIENT)
Dept: FAMILY MEDICINE | Facility: OTHER | Age: 32
End: 2017-05-22
Payer: COMMERCIAL

## 2017-05-22 VITALS
SYSTOLIC BLOOD PRESSURE: 120 MMHG | BODY MASS INDEX: 39.87 KG/M2 | TEMPERATURE: 97.8 F | DIASTOLIC BLOOD PRESSURE: 82 MMHG | HEIGHT: 63 IN | WEIGHT: 225 LBS | HEART RATE: 76 BPM | RESPIRATION RATE: 18 BRPM

## 2017-05-22 DIAGNOSIS — Z34.03 ENCOUNTER FOR SUPERVISION OF NORMAL FIRST PREGNANCY IN THIRD TRIMESTER: Primary | ICD-10-CM

## 2017-05-22 DIAGNOSIS — O26.893 RH NEGATIVE STATUS IN SINGLETON PREGNANCY IN THIRD TRIMESTER: ICD-10-CM

## 2017-05-22 DIAGNOSIS — F33.0 MAJOR DEPRESSIVE DISORDER, RECURRENT EPISODE, MILD (H): ICD-10-CM

## 2017-05-22 DIAGNOSIS — Z67.91 RH NEGATIVE STATUS IN SINGLETON PREGNANCY IN THIRD TRIMESTER: ICD-10-CM

## 2017-05-22 PROCEDURE — 99207 ZZC COMPLICATED OB VISIT: CPT | Performed by: FAMILY MEDICINE

## 2017-05-22 ASSESSMENT — PAIN SCALES - GENERAL: PAINLEVEL: MODERATE PAIN (4)

## 2017-05-22 NOTE — MR AVS SNAPSHOT
After Visit Summary   5/22/2017    Teresita Hutton    MRN: [id number]           Patient Information     Date Of Birth          1985        Visit Information        Provider Department      5/22/2017 8:00 AM Nikki Castro MD [facility]        Today's Diagnoses     Encounter for supervision of normal first pregnancy in third trimester    -  1    Rh negative status in sears pregnancy in third trimester        Major depressive disorder, recurrent episode, mild (H)           Follow-ups after your visit        Your next 10 appointments already scheduled     May 31, 2017  8:30 AM CDT   ESTABLISHED PRENATAL with Nikki Castro MD   [facility] ([facility])    [address] [address]   [phone]            Jun 12, 2017  8:00 AM CDT   ESTABLISHED PRENATAL with Nikki Castro MD   [facility] ([facility])    [address] [facility] [address]  [facility] [address]   [phone]              Who to contact     If you have questions or need follow up information about today's clinic visit or your schedule please contact [facility] directly at [phone].  Normal or non-critical lab and imaging results will be communicated to you by 0xdatahart, letter or phone within 4 business days after the clinic has received the results. If you do not hear from us within 7 days, please contact the clinic through 0xdatahart or phone. If you have a critical or abnormal lab result, we will notify you by phone as soon as possible.  Submit refill requests through [employer] or call your pharmacy and they will forward the refill request to us. Please allow 3 business days for your refill to be completed.          Additional Information About Your Visit        MyChart Information     [email or web address] gives you secure access to your electronic health record. If you see a primary care provider, you can also send messages  "to your care team and make appointments. If you have questions, please call your primary care clinic.  If you do not have a primary care provider, please call [phone] and they will assist you.        Care EveryWhere ID     This is your Care EveryWhere ID. This could be used by other organizations to access your [place] medical records  [id number]        Your Vitals Were     Pulse Temperature Respirations Height Last Period BMI (Body Mass Index)    76 97.8  F (36.6  C) (Temporal) 18 5' 3\" (1.6 m) 09/03/2016 (Exact Date) 39.86 kg/m2       Blood Pressure from Last 3 Encounters:   05/22/17 120/82   05/15/17 120/86   05/01/17 124/72    Weight from Last 3 Encounters:   05/22/17 225 lb (102.1 kg)   05/15/17 224 lb (101.6 kg)   05/01/17 216 lb (98 kg)              Today, you had the following     No orders found for display       Primary Care Provider Office Phone # Fax #    Nikki Aileen Castro [phone]       [facility]  [address] [facility] [address]        Thank you!     Thank you for choosing [facility]  for your care. Our goal is always to provide you with excellent care. Hearing back from our patients is one way we can continue to improve our services. Please take a few minutes to complete the written survey that you may receive in the mail after your visit with us. Thank you!             Your Updated Medication List - Protect others around you: Learn how to safely use, store and throw away your medicines at www.disposemymeds.org.          This list is accurate as of: 5/22/17  9:41 AM.  Always use your most recent med list.                   Brand Name Dispense Instructions for use    order for DME     1 each    Breast pump       PRENATAL 1 PO      Take 1 tablet by mouth daily         "

## 2017-05-22 NOTE — PROGRESS NOTES
Many concerns from her and partner today.  Discussed eyes/thighs and vaccines.  Expectations for CHD and  screening as well as bilirubin and jaundice.  Labor and pain control choices as well as capsules of placenta, , and cord blood storage.  Encouraged all monitoring as routine.  The rest are choices which are not encouraged but can be used.  Also reviewed need for rhogam after delivery based on baby's labs.  Has had significant weight gain this pregnancy.  Discussed not time to try to lose weight, but important to try to stay healthy.  A little anxious today, but mood otherwise really doing well.  Feeling well.    Good fetal movement.  No VB, LOF, contractions.    No HA, RUQ pain, N/V, visual changes.  Labor precautions discussed.  RTC 1 week.  Nikki Castro MD

## 2017-05-22 NOTE — NURSING NOTE
"Chief Complaint   Patient presents with     Prenatal Care     37 weeks 2 days     Health Maintenance       Initial /82 (BP Location: Left arm, Patient Position: Chair, Cuff Size: Adult Regular)  Pulse 76  Temp 97.8  F (36.6  C) (Temporal)  Resp 18  Ht 5' 3\" (1.6 m)  Wt 225 lb (102.1 kg)  LMP 09/03/2016 (Exact Date)  BMI 39.86 kg/m2 Estimated body mass index is 39.86 kg/(m^2) as calculated from the following:    Height as of this encounter: 5' 3\" (1.6 m).    Weight as of this encounter: 225 lb (102.1 kg).  Medication Reconciliation: complete  "

## 2017-05-31 ENCOUNTER — PRENATAL OFFICE VISIT (OUTPATIENT)
Dept: FAMILY MEDICINE | Facility: OTHER | Age: 32
End: 2017-05-31
Payer: COMMERCIAL

## 2017-05-31 VITALS
WEIGHT: 225 LBS | TEMPERATURE: 97.9 F | HEART RATE: 76 BPM | HEIGHT: 63 IN | RESPIRATION RATE: 16 BRPM | DIASTOLIC BLOOD PRESSURE: 96 MMHG | BODY MASS INDEX: 39.87 KG/M2 | SYSTOLIC BLOOD PRESSURE: 138 MMHG

## 2017-05-31 DIAGNOSIS — Z34.03 ENCOUNTER FOR SUPERVISION OF NORMAL FIRST PREGNANCY IN THIRD TRIMESTER: Primary | ICD-10-CM

## 2017-05-31 DIAGNOSIS — R03.0 ELEVATED BLOOD PRESSURE READING WITHOUT DIAGNOSIS OF HYPERTENSION: ICD-10-CM

## 2017-05-31 LAB
CREAT UR-MCNC: 99 MG/DL
PROT UR-MCNC: 0.15 G/L
PROT/CREAT 24H UR: 0.15 G/G CR (ref 0–0.2)

## 2017-05-31 PROCEDURE — 84156 ASSAY OF PROTEIN URINE: CPT | Performed by: FAMILY MEDICINE

## 2017-05-31 PROCEDURE — 99207 ZZC COMPLICATED OB VISIT: CPT | Performed by: FAMILY MEDICINE

## 2017-05-31 ASSESSMENT — PAIN SCALES - GENERAL: PAINLEVEL: SEVERE PAIN (6)

## 2017-05-31 NOTE — NURSING NOTE
"Chief Complaint   Patient presents with     Prenatal Care     38 weeks 4 days     Health Maintenance       Initial /86  Pulse 76  Temp 97.9  F (36.6  C) (Temporal)  Resp 16  Ht 5' 3\" (1.6 m)  Wt 225 lb (102.1 kg)  LMP 09/03/2016 (Exact Date)  BMI 39.86 kg/m2 Estimated body mass index is 39.86 kg/(m^2) as calculated from the following:    Height as of this encounter: 5' 3\" (1.6 m).    Weight as of this encounter: 225 lb (102.1 kg).  Medication Reconciliation: complete  "

## 2017-05-31 NOTE — Clinical Note
OB with increasing BP, but no protein or symptoms.  I am out of the office next week if you could follow her up.  Planning [facility] for delivery if anything comes up.  She thinks she just had a lot of salt this weekend to influence the BP, so we will see. Nikki Castro MD

## 2017-05-31 NOTE — PROGRESS NOTES
Feeling well.    Good fetal movement.  No VB, LOF, contractions.    No HA, RUQ pain, N/V, visual changes.  Labor precautions discussed.  RTC 1 week.  Discussed BP trending up and although I am on vacation and initially thought she would be good for 10 days, I would like her to keep a closer eye on this.  Will have her f/u with one of the other prenatal providers this next week or earlier if she has symptoms.  Nikki Castro MD

## 2017-05-31 NOTE — MR AVS SNAPSHOT
After Visit Summary   5/31/2017    Teresita Hutton    MRN: [id number]           Patient Information     Date Of Birth          1985        Visit Information        Provider Department      5/31/2017 8:30 AM Nikki Castro MD [facility]        Today's Diagnoses     Encounter for supervision of normal first pregnancy in third trimester    -  1       Follow-ups after your visit        Your next 10 appointments already scheduled     Jun 06, 2017  3:15 PM CDT   ESTABLISHED PRENATAL with PEPITO Darnell CNM   [facility]    [address]  [facility] [address]   [phone]            Jun 12, 2017  8:00 AM CDT   ESTABLISHED PRENATAL with Nikki Castro MD   [facility]    [address]  [facility] [address] [phone]              Who to contact     If you have questions or need follow up information about today's clinic visit or your schedule please contact [facility] directly at [phone].  Normal or non-critical lab and imaging results will be communicated to you by Hitposthart, letter or phone within 4 business days after the clinic has received the results. If you do not hear from us within 7 days, please contact the clinic through Hitposthart or phone. If you have a critical or abnormal lab result, we will notify you by phone as soon as possible.  Submit refill requests through Pharmalink or call your pharmacy and they will forward the refill request to us. Please allow 3 business days for your refill to be completed.          Additional Information About Your Visit        Hitposthart Information     Pharmalink gives you secure access to your electronic health record. If you see a primary care provider, you can also send messages to your care team and make appointments. If you have questions, please call your primary care clinic.  If you do not have a primary  "care provider, please call [phone] and they will assist you.        Care EveryWhere ID     This is your Care EveryWhere ID. This could be used by other organizations to access your [place] medical records  [id number]        Your Vitals Were     Pulse Temperature Respirations Height Last Period BMI (Body Mass Index)    76 97.9  F (36.6  C) (Temporal) 16 5' 3\" (1.6 m) 09/03/2016 (Exact Date) 39.86 kg/m2       Blood Pressure from Last 3 Encounters:   05/31/17 (!) 138/96   05/22/17 120/82   05/15/17 120/86    Weight from Last 3 Encounters:   05/31/17 225 lb (102.1 kg)   05/22/17 225 lb (102.1 kg)   05/15/17 224 lb (101.6 kg)              We Performed the Following     Creatinine urine calculation only     Protein  random urine        Primary Care Provider Office Phone # Fax #    Nikki Aileen Castro [phone]       [address] [address]        Thank you!     Thank you for choosing [facility]  for your care. Our goal is always to provide you with excellent care. Hearing back from our patients is one way we can continue to improve our services. Please take a few minutes to complete the written survey that you may receive in the mail after your visit with us. Thank you!             Your Updated Medication List - Protect others around you: Learn how to safely use, store and throw away your medicines at www.disposemymeds.org.          This list is accurate as of: 5/31/17  9:13 AM.  Always use your most recent med list.                   Brand Name Dispense Instructions for use    order for DME     1 each    Breast pump       PRENATAL 1 PO      Take 1 tablet by mouth daily         "

## 2017-06-05 ENCOUNTER — TELEPHONE (OUTPATIENT)
Dept: FAMILY MEDICINE | Facility: OTHER | Age: 32
End: 2017-06-05

## 2017-06-05 NOTE — TELEPHONE ENCOUNTER
Reason for call:  Patient reporting a symptom    Symptom or request: lost mucus plug / 39 weeks    Duration (how long have symptoms been present): today    Have you been treated for this before? No    Additional comments: patient states she just lost her mucus plug    Phone Number patient can be reached at:  Home number on file [phone] (home) or Cell number on file:    Telephone Information:   Mobile [phone]       Best Time:  anytime    Can we leave a detailed message on this number:  YES    Call taken on 6/5/2017 at 12:09 PM by Kim Aly

## 2017-06-05 NOTE — TELEPHONE ENCOUNTER
"Teresita Hutton is a 31 year old female who calls who is 39 weeks weeks pregnant and feels like she is loosing her mucous plug.    NURSING ASSESSMENT:  Description:  Feels like she lost her mucous plug - pinkish brown discharge. Increased pelvic pressure and public bone burning. Lower abdominal pain. New mild lower back pain. Change in stool from her typical stool to a loose BMs. Denies \"contractions\" - stated she is not sure if this is what she's feeling, water braking/gush of fluid.   Onset/duration:  today  Pain scale (0-10)   Mild lower back pain and lower abdominal pain  LMP/preg/breast feeding:  Patient's last menstrual period was 09/03/2016 (exact date).  GA: 39w2d  ALTON: Estimated Date of Delivery: Teo 10, 2017  Last exam/Treatment:  05/31/2017  Allergies:   Allergies   Allergen Reactions     Sulfa Drugs      Burning     NURSING PLAN: Nursing advice to patient to go in to L&D for evaluation due to early signs of labor    RECOMMENDED DISPOSITION:  >20 weeks gestation: go to Birthing Center - Gave report to  L&D nurse  Will comply with recommendation: Yes  If further questions/concerns or if symptoms do not improve, worsen or new symptoms develop, call your PCP or [facility] as soon as possible.    NOTES:  Disposition was determined by the first positive assessment question, therefore all previous assessment questions were negative    Guideline used:  Telephone Triage for Obstetrics and Gynecology, Varsha Brar and Kay Edmond  3rd Trimester Recognizing Labor  Nursing Judgment  Gave report to  L&D    Yumiko Chappell, RN, BSN       "

## 2017-06-05 NOTE — TELEPHONE ENCOUNTER
Teresita Hutton is a 31 year old female-     NURSING ASSESSMENT:  Description:  Patient is 39 weeks pregnant. She feels like she may have just lost her mucus plug a few minutes ago when she went to the bathroom. Denies contractions or rupture of membranes. She is feeling slightly more pressure than before, but not severe. Denies vaginal bleeding, back pain, headache.   Onset/duration:  Today   Precip. factors:  pregnant  Pain scale (0-10)   0/10  LMP/preg/breast feedin weeks   Last exam/Treatment:  2017  Allergies:   Allergies   Allergen Reactions     Sulfa Drugs      Burning     NURSING PLAN: Nursing advice to patient monitor for signs of labor.     RECOMMENDED DISPOSITION:  Home care advice - monitor for contractions, low backache, increase uterine tightening, leaking of fluids. Contact birthplace if increasing labor symptoms.   Will comply with recommendation: Yes  If further questions/concerns or if symptoms do not improve, worsen or new symptoms develop, call your PCP or [facility] as soon as possible.      Guideline used: 3rd trimester recognizing labor  Telephone Triage for Obstetrics and Gynecology, Varsha Brar and Kay Kay RN

## 2017-06-06 ENCOUNTER — PRENATAL OFFICE VISIT (OUTPATIENT)
Dept: OBGYN | Facility: OTHER | Age: 32
End: 2017-06-06
Payer: COMMERCIAL

## 2017-06-06 VITALS
BODY MASS INDEX: 40.39 KG/M2 | DIASTOLIC BLOOD PRESSURE: 90 MMHG | WEIGHT: 228 LBS | HEART RATE: 78 BPM | SYSTOLIC BLOOD PRESSURE: 144 MMHG

## 2017-06-06 DIAGNOSIS — Z34.83 ENCOUNTER FOR SUPERVISION OF OTHER NORMAL PREGNANCY IN THIRD TRIMESTER: Primary | ICD-10-CM

## 2017-06-06 DIAGNOSIS — O13.2 GESTATIONAL HYPERTENSION, SECOND TRIMESTER: ICD-10-CM

## 2017-06-06 PROCEDURE — 59025 FETAL NON-STRESS TEST: CPT | Performed by: ADVANCED PRACTICE MIDWIFE

## 2017-06-06 PROCEDURE — 99207 ZZC PRENATAL VISIT: CPT | Performed by: ADVANCED PRACTICE MIDWIFE

## 2017-06-06 ASSESSMENT — PAIN SCALES - GENERAL: PAINLEVEL: NO PAIN (0)

## 2017-06-06 NOTE — PROGRESS NOTES
Here for schedule PNV.  Was at  yesterday for PIH work up  PT/CR ratio 0.25  Has had elevated BP x 2 here now diagnostic for GHT.   Consulted with Dr Cabezas will go to [facility] for ripening and IOL .   NST for GHT reactive. Baseline 145 with mod FHRV accels no decls no contr.   Saundra Baxter, APRN, CNM

## 2017-06-06 NOTE — NURSING NOTE
"Chief Complaint   Patient presents with     Prenatal Care       Initial /90  Pulse 78  Wt 228 lb (103.4 kg)  LMP 09/03/2016 (Exact Date)  BMI 40.39 kg/m2 Estimated body mass index is 40.39 kg/(m^2) as calculated from the following:    Height as of 5/31/17: 5' 3\" (1.6 m).    Weight as of this encounter: 228 lb (103.4 kg).  Medication Reconciliation: complete     39w3d    "

## 2017-06-06 NOTE — MR AVS SNAPSHOT
After Visit Summary   6/6/2017    Teresita Hutton    MRN: [id number]           Patient Information     Date Of Birth          1985        Visit Information        Provider Department      6/6/2017 3:15 PM Saundra Lovett APRN CNM [facility]        Today's Diagnoses     Encounter for supervision of other normal pregnancy in third trimester    -  1    Gestational hypertension, third trimester           Follow-ups after your visit        Your next 10 appointments already scheduled     Jun 12, 2017  8:00 AM CDT   ESTABLISHED PRENATAL with Nikki Castro MD   [facility]    [address]  [facility] [id number] [phone]              Who to contact     If you have questions or need follow up information about today's clinic visit or your schedule please contact [facility] directly at [phone].  Normal or non-critical lab and imaging results will be communicated to you by MyChart, letter or phone within 4 business days after the clinic has received the results. If you do not hear from us within 7 days, please contact the clinic through Levelhart or phone. If you have a critical or abnormal lab result, we will notify you by phone as soon as possible.  Submit refill requests through DigitalOcean or call your pharmacy and they will forward the refill request to us. Please allow 3 business days for your refill to be completed.          Additional Information About Your Visit        MyChart Information     DigitalOcean gives you secure access to your electronic health record. If you see a primary care provider, you can also send messages to your care team and make appointments. If you have questions, please call your primary care clinic.  If you do not have a primary care provider, please call [phone] and they will assist you.        Care EveryWhere ID     This is your Care EveryWhere ID. This could be used by  other organizations to access your [place] medical records  [id number]        Your Vitals Were     Pulse Last Period BMI (Body Mass Index)             78 09/03/2016 (Exact Date) 40.39 kg/m2          Blood Pressure from Last 3 Encounters:   06/06/17 144/90   05/31/17 (!) 138/96   05/22/17 120/82    Weight from Last 3 Encounters:   06/06/17 228 lb (103.4 kg)   05/31/17 225 lb (102.1 kg)   05/22/17 225 lb (102.1 kg)              We Performed the Following     FETAL NON-STRESS TEST        Primary Care Provider Office Phone # Fax #    Nikki Castro [phone]       [address] [address]        Thank you!     Thank you for choosing [facility]  for your care. Our goal is always to provide you with excellent care. Hearing back from our patients is one way we can continue to improve our services. Please take a few minutes to complete the written survey that you may receive in the mail after your visit with us. Thank you!             Your Updated Medication List - Protect others around you: Learn how to safely use, store and throw away your medicines at www.disposemymeds.org.          This list is accurate as of: 6/6/17  4:48 PM.  Always use your most recent med list.                   Brand Name Dispense Instructions for use    order for DME     1 each    Breast pump       PRENATAL 1 PO      Take 1 tablet by mouth daily

## 2017-06-08 ENCOUNTER — TELEPHONE (OUTPATIENT)
Dept: FAMILY MEDICINE | Facility: OTHER | Age: 32
End: 2017-06-08

## 2017-06-08 NOTE — TELEPHONE ENCOUNTER
Reason for Call:  Form, our goal is to have forms completed with 72 hours, however, some forms may require a visit or additional information.    Type of letter, form or note:  FMLA    Who is the form from?: [employer] (if other please explain)    Where did the form come from: form was faxed in    What clinic location was the form placed at?: [facility] - [phone]    Where the form was placed: 's Box    What number is listed as a contact on the form?: Priya Day [phone]       Additional comments: maternity leave FMLA    Call taken on 6/8/2017 at 3:56 PM by Zoila Alva

## 2017-06-12 ENCOUNTER — TELEPHONE (OUTPATIENT)
Dept: FAMILY MEDICINE | Facility: OTHER | Age: 32
End: 2017-06-12

## 2017-06-12 NOTE — TELEPHONE ENCOUNTER
LM for pt to call back, she is scheduled for an incision check and the 14th is too soon.  She needs to be seen sometime between 19th-21st.  Her son is scheduled for a circumcision on the 14th, he can keep that or reschedule to been seen 19th-21st when she comes in for incision check.

## 2017-06-12 NOTE — PROGRESS NOTES
SUBJECTIVE:                                                    Teresita Hutton is a 31 year old female who presents to clinic today for the following health issues:    Incision site check 2 weeks pp      HPI    -------------------------------------    Problem list and histories reviewed & adjusted, as indicated.  Additional history: patient is having sore nipples from breastfeeding         Hospital Follow-up Visit:    Hospital/Nursing Home/ Rehab Facility: [facility] [place]  Date of Admission: 6/6/17  Date of Discharge: 6/10/17  Reason(s) for Admission: PIH, delivery            Problems taking medications regularly:  None       Medication changes since discharge: stopped narcotics, no longer needing them       Problems adhering to non-medication therapy:  none    Summary of hospitalization:  CareEverwhere information obtained and reviewed  Diagnostic Tests/Treatments reviewed.  Follow up needed: 6-8 week post partum  Other Healthcare Providers Involved in Patient s Care:         None  Update since discharge: improved. Is noting difficulties with breastfeeding and now some issues with low ab discomfort    Post Discharge Medication Reconciliation: discharge medications reconciled, continue medications without change.  Plan of care communicated with patient     Coding guidelines for this visit:  Type of Medical   Decision Making Face-to-Face Visit       within 7 Days of discharge Face-to-Face Visit        within 14 days of discharge   Moderate Complexity 34313 [id number]   High Complexity 25037 [id number]            Patient Active Problem List   Diagnosis     CARDIOVASCULAR SCREENING; LDL GOAL LESS THAN 160     Mild major depression (H)     Rh negative status in sears pregnancy in third trimester     Encounter for supervision of other normal pregnancy in third trimester     Past Surgical History:   Procedure Laterality Date     wisdom teeth         Social History   Substance Use Topics     Smoking status: Never Smoker      Smokeless tobacco: Not on file      Comment: no smoking in the home     Alcohol use No      Comment: a beer a week     Family History   Problem Relation Age of Onset     DIABETES Paternal Grandfather      REYNA.A.D. Paternal Grandfather      Family History Negative Mother      Depression Mother      Thyroid Disease Mother      Family History Negative Father      Depression Maternal Uncle            ROS:  C: NEGATIVE for fever, chills, change in weight  E/M: NEGATIVE for ear, mouth and throat problems  R: NEGATIVE for significant cough or SOB  CV: NEGATIVE for chest pain, palpitations or peripheral edema  GI: mild discomfort with pants pushing on the lower ab  : urinary normal and still with light post partum flow    OBJECTIVE:                                                    /90  Pulse 87  Temp 98.4  F (36.9  C) (Oral)  Resp 16  Wt 210 lb (95.3 kg)  LMP 2016 (Exact Date)  SpO2 98%  BMI 37.2 kg/m2  Body mass index is 37.2 kg/(m^2).   GENERAL: healthy, alert, well nourished, well hydrated, no distress  RESP: lungs clear to auscultation - no rales, no rhonchi, no wheezes  CV: regular rates and rhythm, normal S1 S2, no S3 or S4 and no murmur, no click or rub -  ABDOMEN: soft, no tenderness, no  hepatosplenomegaly, no masses, normal bowel sounds  Inc: clean/dry/intact         ASSESSMENT/PLAN:                                                        ICD-10-CM    1. S/P  section Z98.891    2. Increased blood pressure (not hypertension) R03.0        Incision healing well.  May advance activities.  Encouraged maren's.  Exam looks pretty normal today, but she mentions some discomfort, if increasing should think about antibiotics as she had chorio on the placenta pathology.  Though is 2 weeks out and symptoms seem mild at this time, so not started today.  As for BP, was elevated at delivery and has not yet returned to baseline.  Will see if it does by 6 weeks pp, or may need intervention.  Asked to be  careful on caffeine, salt, etc.  As well as starting in on walking routine.      Nikki Castro MD, MD  [facility]

## 2017-06-19 ENCOUNTER — OFFICE VISIT (OUTPATIENT)
Dept: FAMILY MEDICINE | Facility: OTHER | Age: 32
End: 2017-06-19
Payer: COMMERCIAL

## 2017-06-19 VITALS
SYSTOLIC BLOOD PRESSURE: 138 MMHG | BODY MASS INDEX: 37.2 KG/M2 | RESPIRATION RATE: 16 BRPM | TEMPERATURE: 98.4 F | WEIGHT: 210 LBS | HEART RATE: 87 BPM | OXYGEN SATURATION: 98 % | DIASTOLIC BLOOD PRESSURE: 90 MMHG

## 2017-06-19 DIAGNOSIS — R03.0: ICD-10-CM

## 2017-06-19 DIAGNOSIS — Z98.891 S/P CESAREAN SECTION: Primary | ICD-10-CM

## 2017-06-19 PROCEDURE — 99213 OFFICE O/P EST LOW 20 MIN: CPT | Performed by: FAMILY MEDICINE

## 2017-06-19 ASSESSMENT — PAIN SCALES - GENERAL: PAINLEVEL: NO PAIN (0)

## 2017-06-19 NOTE — NURSING NOTE
"Chief Complaint   Patient presents with     Wound Check     Health Maintenance       Initial /90  Pulse 87  Temp 98.4  F (36.9  C) (Oral)  Resp 16  Wt 210 lb (95.3 kg)  LMP 09/03/2016 (Exact Date)  SpO2 98%  BMI 37.2 kg/m2 Estimated body mass index is 37.2 kg/(m^2) as calculated from the following:    Height as of 5/31/17: 5' 3\" (1.6 m).    Weight as of this encounter: 210 lb (95.3 kg).  Medication Reconciliation: complete    "

## 2017-06-19 NOTE — MR AVS SNAPSHOT
After Visit Summary   2017    Teresita Hutton    MRN: [id number]           Patient Information     Date Of Birth          1985        Visit Information        Provider Department      2017 10:45 AM Nikki Castro MD [facility]        Today's Diagnoses     S/P  section    -  1    Increased blood pressure (not hypertension)           Follow-ups after your visit        Who to contact     If you have questions or need follow up information about today's clinic visit or your schedule please contact [facility] directly at [phone].  Normal or non-critical lab and imaging results will be communicated to you by Innovative Roadshart, letter or phone within 4 business days after the clinic has received the results. If you do not hear from us within 7 days, please contact the clinic through Innovative Roadshart or phone. If you have a critical or abnormal lab result, we will notify you by phone as soon as possible.  Submit refill requests through Skemaz or call your pharmacy and they will forward the refill request to us. Please allow 3 business days for your refill to be completed.          Additional Information About Your Visit        MyChart Information     Skemaz gives you secure access to your electronic health record. If you see a primary care provider, you can also send messages to your care team and make appointments. If you have questions, please call your primary care clinic.  If you do not have a primary care provider, please call [phone] and they will assist you.        Care EveryWhere ID     This is your Care EveryWhere ID. This could be used by other organizations to access your [place] medical records  [id number]        Your Vitals Were     Pulse Temperature Respirations Last Period Pulse Oximetry BMI (Body Mass Index)    87 98.4  F (36.9  C) (Oral) 16 2016 (Exact Date) 98% 37.2 kg/m2       Blood Pressure from Last 3 Encounters:   17  138/90   06/06/17 144/90   05/31/17 (!) 138/96    Weight from Last 3 Encounters:   06/19/17 210 lb (95.3 kg)   06/06/17 228 lb (103.4 kg)   05/31/17 225 lb (102.1 kg)              Today, you had the following     No orders found for display       Primary Care Provider Office Phone # Fax #    Nikki Aileen Castro [phone]       [address] [address]        Thank you!     Thank you for choosing [facility]  for your care. Our goal is always to provide you with excellent care. Hearing back from our patients is one way we can continue to improve our services. Please take a few minutes to complete the written survey that you may receive in the mail after your visit with us. Thank you!             Your Updated Medication List - Protect others around you: Learn how to safely use, store and throw away your medicines at www.disposemymeds.org.          This list is accurate as of: 6/19/17 11:10 AM.  Always use your most recent med list.                   Brand Name Dispense Instructions for use    order for DME     1 each    Breast pump       PRENATAL 1 PO      Take 1 tablet by mouth daily

## 2017-06-20 ASSESSMENT — PATIENT HEALTH QUESTIONNAIRE - PHQ9: SUM OF ALL RESPONSES TO PHQ QUESTIONS 1-9: 0

## 2017-07-24 NOTE — PROGRESS NOTES
"  Teresita is here for a 6-week postpartum checkup.    She had a c/s for abnormal FHT of a viable boy, weight 5 pounds 0 oz., with gestational HTN complications. Date of delivery was 17. Since delivery, she has been breast feeding.  She has no signs of infection, bleeding or other complications.  She is not pregnant.  We discussed contraceptions and she has chosen condoms.      Post partum tubal: No  History of Gestational Diabetes? No  Type of Delivery:    Feeding Method:  Breast and bottle  If initiated breast feeding and stopped, how long did you breast feed?:     REVIEW OF SYSTEMS:  Ears/Nose/Throat: negative  Respiratory: negative  Cardiovascular: negative  Gastrointestinal: negative  Genitourinary: negative  Musculoskeletal: negative    Neurologic: negative   Skin: negative   Endocrine:  negative  Vagina: negative  Cervix: negative  Breasts: negative  Vulva: negative  Episiotomy: negative    Contraception Plan: condoms    Medical History Reviewed yes -   Family History Reviewed yes -   Problem List Updated yes -     EXAM:  BP (!) 124/92 (BP Location: Right arm, Patient Position: Chair, Cuff Size: Adult Regular)  Pulse 111  Temp 97.7  F (36.5  C) (Temporal)  Ht 5' 3\" (1.6 m)  Wt 208 lb (94.3 kg)  LMP 2016 (Exact Date)  SpO2 95%  BMI 36.85 kg/m2  HEENT: grossly normal.  NECK: no lymphadenopathy or thyroidomegaly.  LUNGS: CTA X 2, no rales or crackles.  BACK: No spinal or CVA tenderness.  HEART: RRR without murmurs clicks or gallops.  ABDOMEN: soft, non tender, good bowel sounds, without masses rebound, guarding or tenderness.  PELVIC:  External genitalia; normal without lesion, repair well healed.   Vagina: normal mucosa and rugae, no discharge.  Cervix: multiparous, well healed, without lesion.  Uterus: non pregnant in size, firm , mobile, no lesions,     Normal shape, position and consistency  Adnexa: non tender, without masses.    EXTREMITIES:  warm to touch, good pulses, no ankle " edema or calf tenderness.  NEUROLOGIC: grossly normal.    ASSESSMENT:   6-week postpartum exam after c/s for abnormal FHT.    PLAN:    Contraception methods discussed  Follow up in 1 year  Weight loss recommended.  One-hour glucose tolerance test needed? No  condoms for contraception.  Answers for HPI/ROS submitted by the patient on 7/27/2017   PHQ9 TOTAL SCORE: 0    The information in this document, created by the medical scribe for me, accurately reflects the services I personally performed and the decisions made by me. I have reviewed and approved this document for accuracy.   Nikki Castro MD

## 2017-07-27 ENCOUNTER — OFFICE VISIT (OUTPATIENT)
Dept: FAMILY MEDICINE | Facility: OTHER | Age: 32
End: 2017-07-27
Payer: COMMERCIAL

## 2017-07-27 ENCOUNTER — MYC MEDICAL ADVICE (OUTPATIENT)
Dept: FAMILY MEDICINE | Facility: OTHER | Age: 32
End: 2017-07-27

## 2017-07-27 VITALS
WEIGHT: 208 LBS | OXYGEN SATURATION: 95 % | TEMPERATURE: 97.7 F | BODY MASS INDEX: 36.86 KG/M2 | HEIGHT: 63 IN | HEART RATE: 111 BPM | SYSTOLIC BLOOD PRESSURE: 124 MMHG | DIASTOLIC BLOOD PRESSURE: 80 MMHG

## 2017-07-27 DIAGNOSIS — Z34.83 ENCOUNTER FOR SUPERVISION OF OTHER NORMAL PREGNANCY IN THIRD TRIMESTER: ICD-10-CM

## 2017-07-27 PROCEDURE — 99207 ZZC POST PARTUM EXAM: CPT | Performed by: FAMILY MEDICINE

## 2017-07-27 ASSESSMENT — PATIENT HEALTH QUESTIONNAIRE - PHQ9
SUM OF ALL RESPONSES TO PHQ QUESTIONS 1-9: 0
SUM OF ALL RESPONSES TO PHQ QUESTIONS 1-9: 0

## 2017-07-27 ASSESSMENT — PAIN SCALES - GENERAL: PAINLEVEL: NO PAIN (0)

## 2017-07-27 NOTE — NURSING NOTE
"Chief Complaint   Patient presents with     Postpartum Care     Panel Management       Initial BP (!) 124/92 (BP Location: Right arm, Patient Position: Chair, Cuff Size: Adult Regular)  Pulse 111  Temp 97.7  F (36.5  C) (Temporal)  Ht 5' 3\" (1.6 m)  Wt 208 lb (94.3 kg)  LMP 09/03/2016 (Exact Date)  SpO2 95%  BMI 36.85 kg/m2 Estimated body mass index is 36.85 kg/(m^2) as calculated from the following:    Height as of this encounter: 5' 3\" (1.6 m).    Weight as of this encounter: 208 lb (94.3 kg).  Medication Reconciliation: complete   Vandana Rivera MA  July 27, 2017      "

## 2017-07-27 NOTE — MR AVS SNAPSHOT
After Visit Summary   7/27/2017    Teresita Hutton    MRN: [id number]           Patient Information     Date Of Birth          1985        Visit Information        Provider Department      7/27/2017 10:30 AM Nikki Castro MD [facility]        Today's Diagnoses     Routine postpartum follow-up    -  1    Encounter for supervision of other normal pregnancy in third trimester          Care Instructions      Postpartum Depression - English    Postpartum Depression - Bangladeshi          Follow-ups after your visit        Who to contact     If you have questions or need follow up information about today's clinic visit or your schedule please contact [facility] directly at [phone].  Normal or non-critical lab and imaging results will be communicated to you by MyChart, letter or phone within 4 business days after the clinic has received the results. If you do not hear from us within 7 days, please contact the clinic through [employer] or phone. If you have a critical or abnormal lab result, we will notify you by phone as soon as possible.  Submit refill requests through [employer] or call your pharmacy and they will forward the refill request to us. Please allow 3 business days for your refill to be completed.          Additional Information About Your Visit        MyChart Information     [employer] gives you secure access to your electronic health record. If you see a primary care provider, you can also send messages to your care team and make appointments. If you have questions, please call your primary care clinic.  If you do not have a primary care provider, please call [phone] and they will assist you.        Care EveryWhere ID     This is your Care EveryWhere ID. This could be used by other organizations to access your [place] medical records  [id number]        Your Vitals Were     Pulse Temperature Height Last Period Pulse Oximetry BMI (Body Mass Index)     "111 97.7  F (36.5  C) (Temporal) 5' 3\" (1.6 m) 09/03/2016 (Exact Date) 95% 36.85 kg/m2       Blood Pressure from Last 3 Encounters:   07/27/17 124/80   06/19/17 138/90   06/06/17 144/90    Weight from Last 3 Encounters:   07/27/17 208 lb (94.3 kg)   06/19/17 210 lb (95.3 kg)   06/06/17 228 lb (103.4 kg)              Today, you had the following     No orders found for display       Primary Care Provider Office Phone # Fax #    Nikki Castro [phone]       [facility]  [address] [address]        Equal Access to Services     BRYNN ROSS : Trinh hassan Sorodney, waaxda luqadaha, qaybta kaalmada adeegyada, taty wong . So [facility] [phone].    ATENCIÓN: Si habla español, tiene a sterling disposición servicios gratuitos de asistencia lingüística. Llame al [phone].    We comply with applicable federal civil rights laws and [place] laws. We do not discriminate on the basis of race, color, national origin, age, disability sex, sexual orientation or gender identity.            Thank you!     Thank you for choosing [facility]  for your care. Our goal is always to provide you with excellent care. Hearing back from our patients is one way we can continue to improve our services. Please take a few minutes to complete the written survey that you may receive in the mail after your visit with us. Thank you!             Your Updated Medication List - Protect others around you: Learn how to safely use, store and throw away your medicines at www.disposemymeds.org.          This list is accurate as of: 7/27/17 11:08 AM.  Always use your most recent med list.                   Brand Name Dispense Instructions for use Diagnosis    order for DME     1 each    Breast pump    Encounter for supervision of other normal pregnancy in second trimester, Rh negative status in sears pregnancy in second trimester       PRENATAL 1 PO      " Take 1 tablet by mouth daily

## 2017-07-28 ASSESSMENT — PATIENT HEALTH QUESTIONNAIRE - PHQ9: SUM OF ALL RESPONSES TO PHQ QUESTIONS 1-9: 0

## 2017-07-29 ENCOUNTER — MYC MEDICAL ADVICE (OUTPATIENT)
Dept: FAMILY MEDICINE | Facility: OTHER | Age: 32
End: 2017-07-29

## 2017-07-31 NOTE — TELEPHONE ENCOUNTER
Patient is wondering if this visit would be no charge as this was supposed to have already been done? [phone].

## 2017-08-01 NOTE — TELEPHONE ENCOUNTER
Left message for pt to call back to clinic.  Pt will need OV and unfortunately it will be a charged visit per RN recommendations below.  Kiara Block CMA

## 2017-09-08 ENCOUNTER — OFFICE VISIT (OUTPATIENT)
Dept: OBGYN | Facility: CLINIC | Age: 32
End: 2017-09-08
Payer: COMMERCIAL

## 2017-09-08 VITALS
HEIGHT: 64 IN | WEIGHT: 202 LBS | BODY MASS INDEX: 34.49 KG/M2 | DIASTOLIC BLOOD PRESSURE: 70 MMHG | SYSTOLIC BLOOD PRESSURE: 110 MMHG | HEART RATE: 78 BPM

## 2017-09-08 DIAGNOSIS — Z00.00 ROUTINE GENERAL MEDICAL EXAMINATION AT A HEALTH CARE FACILITY: Primary | ICD-10-CM

## 2017-09-08 PROCEDURE — 99207 ZZC NO BILLABLE SERVICE THIS VISIT: CPT | Mod: 25 | Performed by: OBSTETRICS & GYNECOLOGY

## 2017-09-08 ASSESSMENT — PAIN SCALES - GENERAL: PAINLEVEL: MILD PAIN (2)

## 2017-09-08 NOTE — NURSING NOTE
"Chief Complaint   Patient presents with     Physical     Pap is not due       Initial Pulse 78  Ht 5' 3.75\" (1.619 m)  Wt 202 lb (91.6 kg)  LMP 09/03/2016 (Exact Date)  BMI 34.95 kg/m2 Estimated body mass index is 34.95 kg/(m^2) as calculated from the following:    Height as of this encounter: 5' 3.75\" (1.619 m).    Weight as of this encounter: 202 lb (91.6 kg).  Medication Reconciliation: complete  "

## 2017-09-08 NOTE — PROGRESS NOTES
Patient is here today with many questions/concerns..  She has a history of dizziness and blood with bowel movements.  We is not due for a pap smear today so does not want a physical by me.  I discussed with her that her questions would best be addressed by FP.  She will make an appointment with them for her annual exam.  I will not bill her for this visit today.    Hamida Cabezas

## 2017-09-08 NOTE — MR AVS SNAPSHOT
After Visit Summary   9/8/2017    Teresita Hutton    MRN: [id number]           Patient Information     Date Of Birth          1985        Visit Information        Provider Department      9/8/2017 8:15 AM Hamida Cabezas DO [facility]        Today's Diagnoses     Routine general medical examination at a health care facility    -  1       Follow-ups after your visit        Your next 10 appointments already scheduled     Sep 12, 2017  9:20 AM CDT   Office Visit with PEPITO Brown CNP   [facility]    [address] [place] [address], [address]  [facility] [id number] [phone]           Bring a current list of meds and any records pertaining to this visit. For Physicals, please bring immunization records and any forms needing to be filled out. Please arrive 10 minutes early to complete paperwork.            Sep 12, 2017  1:30 PM CDT   New Visit with Marcelo Ortiz DPM   [facility]    [address] [id number] [phone]              Who to contact     If you have questions or need follow up information about today's clinic visit or your schedule please contact [facility] directly at [phone].  Normal or non-critical lab and imaging results will be communicated to you by [employer], letter or phone within 4 business days after the clinic has received the results. If you do not hear from us within 7 days, please contact the clinic through [employer] or phone. If you have a critical or abnormal lab result, we will notify you by phone as soon as possible.  Submit refill requests through [employer] or call your pharmacy and they will forward the refill request to us. Please allow 3 business days for your refill to be completed.          Additional Information About Your Visit        [employer] Information     [employer] gives you secure access to your electronic health record. If  "you see a primary care provider, you can also send messages to your care team and make appointments. If you have questions, please call your primary care clinic.  If you do not have a primary care provider, please call [phone] and they will assist you.        Care EveryWhere ID     This is your Care EveryWhere ID. This could be used by other organizations to access your [place] medical records  [id number]        Your Vitals Were     Pulse Height Last Period BMI (Body Mass Index)          78 1.619 m (5' 3.75\") 09/03/2016 (Exact Date) 34.95 kg/m2         Blood Pressure from Last 3 Encounters:   09/08/17 110/70   07/27/17 124/80   06/19/17 138/90    Weight from Last 3 Encounters:   09/08/17 91.6 kg (202 lb)   07/27/17 94.3 kg (208 lb)   06/19/17 95.3 kg (210 lb)              Today, you had the following     No orders found for display       Primary Care Provider Office Phone # Fax #    Nikki Aileen Castro [phone]       [address] [place] [id number]        Equal Access to Services     [facility]: Hadii aad willian hadjoslyno Sorodney, waaxda luqadaha, qaybta kaalmada adeegyada, taty wong . So [facility] [phone].    ATENCIÓN: Si habla español, tiene a sterling disposición servicios gratuitos de asistencia lingüística. Llame al [phone].    We comply with applicable federal civil rights laws and [place] laws. We do not discriminate on the basis of race, color, national origin, age, disability sex, sexual orientation or gender identity.            Thank you!     Thank you for choosing [facility]  for your care. Our goal is always to provide you with excellent care. Hearing back from our patients is one way we can continue to improve our services. Please take a few minutes to complete the written survey that you may receive in the mail after your visit with us. Thank you!             Your Updated Medication List - Protect others around you: Learn how to " safely use, store and throw away your medicines at www.disposemymeds.org.          This list is accurate as of: 9/8/17  9:18 AM.  Always use your most recent med list.                   Brand Name Dispense Instructions for use Diagnosis    order for DME     1 each    Breast pump    Encounter for supervision of other normal pregnancy in second trimester, Rh negative status in sears pregnancy in second trimester       PRENATAL 1 PO      Take 1 tablet by mouth daily

## 2017-09-12 ENCOUNTER — OFFICE VISIT (OUTPATIENT)
Dept: PODIATRY | Facility: OTHER | Age: 32
End: 2017-09-12
Payer: COMMERCIAL

## 2017-09-12 VITALS — HEIGHT: 64 IN | BODY MASS INDEX: 34.49 KG/M2 | TEMPERATURE: 97.3 F | WEIGHT: 202 LBS

## 2017-09-12 DIAGNOSIS — Q66.6 PES VALGUS: Primary | ICD-10-CM

## 2017-09-12 DIAGNOSIS — L60.0 INGROWING NAIL: ICD-10-CM

## 2017-09-12 PROCEDURE — 99203 OFFICE O/P NEW LOW 30 MIN: CPT | Performed by: PODIATRIST

## 2017-09-12 ASSESSMENT — PAIN SCALES - GENERAL: PAINLEVEL: MODERATE PAIN (4)

## 2017-09-12 NOTE — NURSING NOTE
"Chief Complaint   Patient presents with     Consult     B/L plantar foot pain, Ingrown lateral B/L great toenails; new pt       Initial Temp 97.3  F (36.3  C) (Temporal)  Ht 5' 3.75\" (1.619 m)  Wt 202 lb (91.6 kg)  LMP 09/03/2016 (Exact Date)  BMI 34.95 kg/m2 Estimated body mass index is 34.95 kg/(m^2) as calculated from the following:    Height as of this encounter: 5' 3.75\" (1.619 m).    Weight as of this encounter: 202 lb (91.6 kg).  BP completed using cuff size: NA (Not Taken)  Medication Reconciliation: complete    Venus Holland CMA, September 12, 2017    "

## 2017-09-12 NOTE — PROGRESS NOTES
HPI:  Teresita Hutton is a 32 year old female who is seen in consultation at the request of self.    Pt presents for eval of:   (Onset, Location, L/R, Character, Treatments, Injury if yes)     Onset June 2017, after birth of son, patient is experiencing plantar Left and Right foot pain.  Dull ache, tightness with first steps after lying or sitting, pain 4 - pain decreases with activity  Wearing shoes in house, stretching, massage    Onset July 2017, after a pedicure, Ingrown lateral Left and Right great toenails. 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     ROS:  10 point ROS neg other than the symptoms noted above in the HPI.    PAST MEDICAL HISTORY:   Past Medical History:   Diagnosis Date     NO ACTIVE PROBLEMS         PAST SURGICAL HISTORY:   Past Surgical History:   Procedure Laterality Date     wisdom teeth          MEDICATIONS:   Current Outpatient Prescriptions:      order for DME, Breast pump, Disp: 1 each, Rfl: 0     Prenatal MV-Min-Fe Fum-FA-DHA (PRENATAL 1 PO), Take 1 tablet by mouth daily, Disp: , Rfl:      ALLERGIES:    Allergies   Allergen Reactions     Sulfa Drugs      Burning        SOCIAL HISTORY:   Social History     Social History     Marital status:      Spouse name: N/A     Number of children: N/A     Years of education: N/A     Occupational History     Not on file.     Social History Main Topics     Smoking status: Never Smoker     Smokeless tobacco: Never Used      Comment: no smoking in the home     Alcohol use No      Comment: a beer a week     Drug use: No     Sexual activity: Yes     Partners: Male     Other Topics Concern     Parent/Sibling W/ Cabg, Mi Or Angioplasty Before 65f 55m? 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     Hobby Hazards No     Sleep Concern No     Stress Concern No     Weight Concern No     Special Diet No     Back Care No     Exercise Yes     Advised exercise 30 minutes/day at least 5 days/week.     Seat Belt Yes     Social History Narrative    Lives in [place].  No smokers in the home.  Has an indoor cat.  Advised about toxoplasmosis precautions.  No concerns about domestic violence.        FAMILY HISTORY:   Family History   Problem Relation Age of Onset     DIABETES Paternal Grandfather      REYNA.A.LEX. Paternal Grandfather      Family History Negative Mother      Depression Mother      Thyroid Disease Mother      Family History Negative Father      Depression Maternal Uncle         EXAM:Vitals: Temp 97.3  F (36.3  C) (Temporal)  Ht 5' 3.75\" (1.619 m)  Wt 202 lb (91.6 kg)  LMP 09/03/2016 (Exact Date)  BMI 34.95 kg/m2  BMI= Body mass index is 34.95 kg/(m^2).    General appearance: Patient is alert and fully cooperative with history & exam.  No sign of distress is noted during the visit.     Psychiatric: Affect is pleasant & appropriate.  Patient appears motivated to improve health.     Respiratory: Breathing is regular & unlabored while sitting.     HEENT: Hearing is intact to spoken word.  Speech is clear.  No gross evidence of visual impairment that would impact ambulation.     Vascular: DP & PT pulses are intact & regular bilaterally.  No significant edema or varicosities noted.  CFT and skin temperature is normal to both lower extremities.     Neurologic: Lower extremity sensation is intact to light touch.  No evidence of weakness or contracture in the lower extremities.  No evidence of neuropathy.    Dermatologic: Skin is intact to both lower extremities with adequate texture, turgor and tone about the integument.  No paronychia or evidence of soft tissue infection is noted. Both hallux nails are incurvated along the borders without drainage or abscess. No " hyperkeratosis.    Musculoskeletal: Patient is ambulatory without assistive device or brace.  Mild medial column faulting pes valgus noted and mild generalized discomfort fatigue achiness with firm palpation throughout the entire plantar foot. This is not localized to the Achilles peroneal or posterior tibial tendon or plantar fascia or any specific joint. No crepitus or limited motion throughout range of motion of the ankle subtalar midtarsal metatarsal phalangeal joints.     ASSESSMENT:       ICD-10-CM    1. Pes valgus Q66.6 ORTHOTICS REFERRAL   2. Ingrowing nail L60.0 ORTHOTICS REFERRAL        PLAN:  Reviewed [facility] in [facility].      9/12/2017   We discussed the nature of elastin and type I versus type II collagen and the effects in her foot during pregnancy. Recommended more arch support as she is wearing flip-flops today and she admits that when she wears more supportive shoe gear she has less trouble. Recommended more supportive shoe gear with written instructions  Order for custom molded orthotics  Also discussed ingrown toenails and discussed appropriate debridement at home versus permanent matrixectomy of any affected border. Written instructions postoperative instructions were dispensed and follow-up as needed. All questions were answered for both problems today.    Marcelo Ortiz DPM

## 2017-09-12 NOTE — PATIENT INSTRUCTIONS
INGROWN TOENAIL POSTOPERATIVE INSTRUCTIONS   (Nail avulsion or chemical matrixectomy)   1.  Go directly home and elevate the affected foot on one or two pillows for the remainder of the day & evening. Your toe may stay numb for 2-8 hours.   2.  Take Tylenol, ibuprofen or another anti-inflammatory as needed for pain.   3.  Use oral antibiotic if that was prescribed at your doctor visit. Take the entire prescription even if your symptoms have improved.   4.  Keep dressing dry and intact the day of the procedure. The morning after the procedure, remove entire dressing and soak or wash the affected area in lukewarm water for 5-10 minutes.  You may add Epsom salt to soothe the area and help it become drier. Do this twice a day or more until the surgical site remains dry without drainage.  This may take 1-2 weeks if a small part of your nail was removed or 4-8 weeks if the entire nail was removed. You may count showering or bathing as one soak.  After each soak, pat the area dry with a clean towel or gauze and then allow to air dry for a few minutes. You may apply topical antibiotics, 3-4 drops of Cortisporin if it was prescribed at your visit or apply a very small amount of ointment like Bacitracin or Neosporin to the area.  Then cover with a cloth or fabric bandaid.    5.  You may walk and pursue everyday activities as tolerated with either an open toe shoe or cut-out shoe as needed. You may wear regular roomy shoes if no pain is noted.  No swimming in the lake, river, pool or hot tub until the wound has been dry for 3 days.   7.  Watch for any signs or symptoms of infection such as: red streaks going up the foot/leg, swelling, pus or foul odor. For patients that have had a phenol procedure, the toe will drain longer and may look similar to infection because it is a chemical burn.  Please call with questions.  8.  Follow up in my office in 1-2 weeks if the surgical site has not become dry or if other complications  occur.  9.  There is 5-10% chance of complications such as infection or formation of another nail or a thick scared nail.      Reliable shoe stores: To maximize your experience and provide the best possible fit.  Be sure to show them your foot concerns and tell them Dr. Ortiz sent you.      Stores listed in bold have only athletic shoes, and stores that are not bold are mostly casual or variety of shoes    [employer]  [address] [place] [address]  [phone]     [employer] [place]  [address] [place] [address]  [phone]     [employer] [place]  [address] [place] [address]  [phone]    [employer]  [address] [facility] [address]  [phone]    [employer]  [address] [place] [address]  [phone]    [employer]  [address] [place] [address]  [phone]                         [employer] Locations:     [address] [place] [address]   [phone]     [address] [place] [address] [address] [place] [address]   [phone]     [address] [place] [address]   [phone]     [address] [facility] [place] [address]   [phone]     [address] [place] [address]   [phone]     [address] [place] [address]   [phone]     [address] [facility] [address]   [place] [address]   [phone]     [address] [facility]   [place] [address]   [phone]             How to find reasonable shoes          The correct width    Correct Fitting    Correct Length      Foot Distortion    Posture Distortion                          Torsional Rigidity      Grasp behind the heel and underneath the foot and twist      Bad    Excessive torsion/twist in midfoot     Less torsion/twist in midfoot is better                   Heel Counter Rigidity      Grasp just above   midsole and squeeze      Bad    Soft heel counter      Good    Rigid Heel Counter       Flexion Rigidity      Grasp shoe and bend from forefoot to rearfoot

## 2017-09-12 NOTE — LETTER
9/12/2017         RE: Teresita Hutton  [address] [facility] [address]        Dear Colleague,    Thank you for referring your patient, Teresita Hutton, to the [facility]. Please see a copy of my visit note below.    HPI:  Teresita Hutton is a 32 year old female who is seen in consultation at the request of self.    Pt presents for eval of:   (Onset, Location, L/R, Character, Treatments, Injury if yes)     Onset June 2017, after birth of son, patient is experiencing plantar Left and Right foot pain.  Dull ache, tightness with first steps after lying or sitting, pain 4 - pain decreases with activity  Wearing shoes in house, stretching, massage    Onset July 2017, after a pedicure, Ingrown lateral Left and Right great toenails. 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     ROS:  10 point ROS neg other than the symptoms noted above in the HPI.    PAST MEDICAL HISTORY:   Past Medical History:   Diagnosis Date     NO ACTIVE PROBLEMS         PAST SURGICAL HISTORY:   Past Surgical History:   Procedure Laterality Date     wisdom teeth          MEDICATIONS:   Current Outpatient Prescriptions:      order for DME, Breast pump, Disp: 1 each, Rfl: 0     Prenatal MV-Min-Fe Fum-FA-DHA (PRENATAL 1 PO), Take 1 tablet by mouth daily, Disp: , Rfl:      ALLERGIES:    Allergies   Allergen Reactions     Sulfa Drugs      Burning        SOCIAL HISTORY:   Social History     Social History     Marital status:      Spouse name: N/A     Number of children: N/A     Years of education: N/A     Occupational History     Not on file.     Social History Main Topics     Smoking status: Never Smoker     Smokeless tobacco: Never Used      Comment: no smoking in the home     Alcohol use No      Comment: a beer a week     Drug use: No     Sexual activity: Yes     Partners: Male  "    Other Topics Concern     Parent/Sibling W/ Cabg, Mi Or Angioplasty Before 65f 55m? 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     Hobby Hazards No     Sleep Concern No     Stress Concern No     Weight Concern No     Special Diet No     Back Care No     Exercise Yes     Advised exercise 30 minutes/day at least 5 days/week.     Seat Belt Yes     Social History Narrative    Lives in [place].  No smokers in the home.  Has an indoor cat.  Advised about toxoplasmosis precautions.  No concerns about domestic violence.        FAMILY HISTORY:   Family History   Problem Relation Age of Onset     DIABETES Paternal Grandfather      JESSICA. Paternal Grandfather      Family History Negative Mother      Depression Mother      Thyroid Disease Mother      Family History Negative Father      Depression Maternal Uncle         EXAM:Vitals: Temp 97.3  F (36.3  C) (Temporal)  Ht 5' 3.75\" (1.619 m)  Wt 202 lb (91.6 kg)  LMP 09/03/2016 (Exact Date)  BMI 34.95 kg/m2  BMI= Body mass index is 34.95 kg/(m^2).    General appearance: Patient is alert and fully cooperative with history & exam.  No sign of distress is noted during the visit.     Psychiatric: Affect is pleasant & appropriate.  Patient appears motivated to improve health.     Respiratory: Breathing is regular & unlabored while sitting.     HEENT: Hearing is intact to spoken word.  Speech is clear.  No gross evidence of visual impairment that would impact ambulation.     Vascular: DP & PT pulses are intact & regular bilaterally.  No significant edema or varicosities noted.  CFT and skin temperature is normal to both lower extremities.     Neurologic: Lower extremity sensation is intact to light touch.  No evidence of weakness or contracture in the lower extremities.  No evidence of neuropathy.    Dermatologic: " Skin is intact to both lower extremities with adequate texture, turgor and tone about the integument.  No paronychia or evidence of soft tissue infection is noted. Both hallux nails are incurvated along the borders without drainage or abscess. No hyperkeratosis.    Musculoskeletal: Patient is ambulatory without assistive device or brace.  Mild medial column faulting pes valgus noted and mild generalized discomfort fatigue achiness with firm palpation throughout the entire plantar foot. This is not localized to the Achilles peroneal or posterior tibial tendon or plantar fascia or any specific joint. No crepitus or limited motion throughout range of motion of the ankle subtalar midtarsal metatarsal phalangeal joints.     ASSESSMENT:       ICD-10-CM    1. Pes valgus Q66.6 ORTHOTICS REFERRAL   2. Ingrowing nail L60.0 ORTHOTICS REFERRAL        PLAN:  Reviewed patient's chart in [facility].      9/12/2017   We discussed the nature of elastin and type I versus type II collagen and the effects in her foot during pregnancy. Recommended more arch support as she is wearing flip-flops today and she admits that when she wears more supportive shoe gear she has less trouble. Recommended more supportive shoe gear with written instructions  Order for custom molded orthotics  Also discussed ingrown toenails and discussed appropriate debridement at home versus permanent matrixectomy of any affected border. Written instructions postoperative instructions were dispensed and follow-up as needed. All questions were answered for both problems today.    Marcelo Ortiz DPM      Again, thank you for allowing me to participate in the care of your patient.        Sincerely,        Marcelo Ortiz DPM

## 2017-09-12 NOTE — MR AVS SNAPSHOT
After Visit Summary   9/12/2017    Teresita Hutton    MRN: [id number]           Patient Information     Date Of Birth          1985        Visit Information        Provider Department      9/12/2017 1:30 PM Marcelo Ortiz DPM [facility]        Today's Diagnoses     Pes valgus    -  1    Ingrowing nail          Care Instructions    INGROWN TOENAIL POSTOPERATIVE INSTRUCTIONS   (Nail avulsion or chemical matrixectomy)   1.  Go directly home and elevate the affected foot on one or two pillows for the remainder of the day & evening. Your toe may stay numb for 2-8 hours.   2.  Take Tylenol, ibuprofen or another anti-inflammatory as needed for pain.   3.  Use oral antibiotic if that was prescribed at your doctor visit. Take the entire prescription even if your symptoms have improved.   4.  Keep dressing dry and intact the day of the procedure. The morning after the procedure, remove entire dressing and soak or wash the affected area in lukewarm water for 5-10 minutes.  You may add Epsom salt to soothe the area and help it become drier. Do this twice a day or more until the surgical site remains dry without drainage.  This may take 1-2 weeks if a small part of your nail was removed or 4-8 weeks if the entire nail was removed. You may count showering or bathing as one soak.  After each soak, pat the area dry with a clean towel or gauze and then allow to air dry for a few minutes. You may apply topical antibiotics, 3-4 drops of Cortisporin if it was prescribed at your visit or apply a very small amount of ointment like Bacitracin or Neosporin to the area.  Then cover with a cloth or fabric bandaid.    5.  You may walk and pursue everyday activities as tolerated with either an open toe shoe or cut-out shoe as needed. You may wear regular roomy shoes if no pain is noted.  No swimming in the lake, river, pool or hot tub until the wound has been dry for 3 days.   7.  Watch for any signs  or symptoms of infection such as: red streaks going up the foot/leg, swelling, pus or foul odor. For patients that have had a phenol procedure, the toe will drain longer and may look similar to infection because it is a chemical burn.  Please call with questions.  8.  Follow up in my office in 1-2 weeks if the surgical site has not become dry or if other complications occur.  9.  There is 5-10% chance of complications such as infection or formation of another nail or a thick scared nail.      Reliable shoe stores: To maximize your experience and provide the best possible fit.  Be sure to show them your foot concerns and tell them Dr. Ortiz sent you.      Stores listed in bold have only athletic shoes, and stores that are not bold are mostly casual or variety of shoes    [employer]  [address] [place] [address]  [phone]     [employer] [facility]  [address] [place] [address]  [phone]    [employer] [place]  [address] [place] [address]  [phone]    [employer]  [address] [facility] [address]  [phone]    [employer]  [address] [place], [place] [address] [phone]    [employer]  [address] [place] [address]  [phone]                         [employer] Locations:     [address] [place] [address]   [phone]     [address] [place] [address]   [phone]     [address] [place] [address]   [phone]     [address] [place] [address]   [phone]     [address] [place] [address]   [phone]     [address] [facility] [place] [address]   [phone]     [address] [place] [facility] [address]   [place] [address]   [phone]     [address]   [place] [address]   [phone]             How to find reasonable shoes          The correct width    Correct Fitting    Correct Length      Foot Distortion  "   Posture Distortion                          Torsional Rigidity      Grasp behind the heel and underneath the foot and twist      Bad    Excessive torsion/twist in midfoot     Less torsion/twist in midfoot is better                   Heel Counter Rigidity      Grasp just above   midsole and squeeze      Bad    Soft heel counter      Good    Rigid Heel Counter      Flexion Rigidity      Grasp shoe and bend from forefoot to rearfoot                        Follow-ups after your visit        Additional Services     ORTHOTICS REFERRAL       [facility] scheduling staff may contact patient to arrange appointments for casting of orthotics and often do not leave messages.  The patient may call this number for scheduling at their convenience. Scheduling Phone [phone].      One pair custom functional foot orthotics.   Flexible polypropylene shell with 1/8\" Spenco cushioned top cover, crepe rearfoot post, medial density arch fill, OSMIN bottom cover.  Aerobic model.                  Who to contact     If you have questions or need follow up information about today's clinic visit or your schedule please contact [facility] directly at [phone].  Normal or non-critical lab and imaging results will be communicated to you by Gorbhart, letter or phone within 4 business days after the clinic has received the results. If you do not hear from us within 7 days, please contact the clinic through Audanikat or phone. If you have a critical or abnormal lab result, we will notify you by phone as soon as possible.  Submit refill requests through [employer]Wor or call your pharmacy and they will forward the refill request to us. Please allow 3 business days for your refill to be completed.          Additional Information About Your Visit        CreaWor Information     [employer] gives you secure access to your electronic health record. If you see a primary care provider, you can also send messages to your care team and make " "appointments. If you have questions, please call your primary care clinic.  If you do not have a primary care provider, please call [phone] and they will assist you.        Care EveryWhere ID     This is your Care EveryWhere ID. This could be used by other organizations to access your [place] medical records  [id number]        Your Vitals Were     Temperature Height Last Period BMI (Body Mass Index)          97.3  F (36.3  C) (Temporal) 5' 3.75\" (1.619 m) 09/03/2016 (Exact Date) 34.95 kg/m2         Blood Pressure from Last 3 Encounters:   09/08/17 110/70   07/27/17 124/80   06/19/17 138/90    Weight from Last 3 Encounters:   09/12/17 202 lb (91.6 kg)   09/08/17 202 lb (91.6 kg)   07/27/17 208 lb (94.3 kg)              We Performed the Following     ORTHOTICS REFERRAL        Primary Care Provider Office Phone # Fax #    Nikki Castro [phone]       [address] [place] [address]        Equal Access to Services     [facility] : Hadii eli Jurado, waaxda markel, qaybta hiram mckeon, taty wong . So [facility] [phone].    ATENCIÓN: Si habla español, tiene a sterling disposición servicios gratuitos de asistencia lingüística. [facility] [phone].    We comply with applicable federal civil rights laws and [place] laws. We do not discriminate on the basis of race, color, national origin, age, disability sex, sexual orientation or gender identity.            Thank you!     Thank you for choosing [facility]  for your care. Our goal is always to provide you with excellent care. Hearing back from our patients is one way we can continue to improve our services. Please take a few minutes to complete the written survey that you may receive in the mail after your visit with us. Thank you!             Your Updated Medication List - Protect others around you: Learn how to safely use, store and throw away your medicines at " www.disposemymeds.org.          This list is accurate as of: 9/12/17  2:02 PM.  Always use your most recent med list.                   Brand Name Dispense Instructions for use Diagnosis    order for DME     1 each    Breast pump    Encounter for supervision of other normal pregnancy in second trimester, Rh negative status in sears pregnancy in second trimester       PRENATAL 1 PO      Take 1 tablet by mouth daily

## 2017-09-21 ENCOUNTER — TELEPHONE (OUTPATIENT)
Dept: PODIATRY | Facility: OTHER | Age: 32
End: 2017-09-21

## 2017-09-21 NOTE — TELEPHONE ENCOUNTER
Dr. Ortiz: please see assessment below and provide recommendations.    NURSING NOTE    D:  Patient started wearing more supportive shoes beginning this past weekend which cover the toes. Beginning 3 days ago patient reports increased pain, swelling, redness, and started noting small amounts of dry, crusty drainage (2 days ago was yellow, today more white) to bilateral great toes. Started epson salt soaks yesterday and applied a small dab of antibacterial ointment yesterday.    A:  I will send this to Angel, then return call to patient    R:  Patient verbalizes understanding and will call back if needed.      Nikki Noonan RN  [facility]  9/21/2017 12:38 PM

## 2017-09-21 NOTE — TELEPHONE ENCOUNTER
"Called patient back. Read her the \"home care -wound care\" under clinical references. Patient to call back and request appt if no improvement over the weekend.  Patient verbalizes understanding and will call back if needed.  Nikki Noonan RN  [facility]  9/21/2017 4:05 PM    "

## 2017-09-21 NOTE — TELEPHONE ENCOUNTER
Reason for call:  Symptom  Reason for call:  Patient reporting a symptom    Symptom or request: toenail infection on both feet, and pain    Duration (how long have symptoms been present): 3-4 days    Have you been treated for this before? Yes    Additional comments: pt was seen last week and does not want to have to come in again, she would like to speak with Dr. Ortiz    Phone Number patient can be reached at:  Home number on file [phone] (home)    Best Time:  any    Can we leave a detailed message on this number:  YES    Call taken on 9/21/2017 at 11:17 AM by Ratna Rosas

## 2017-09-21 NOTE — TELEPHONE ENCOUNTER
That sounds fine.  If soaking does not resolve it,she should come in for evaluation and treatment of nail with procedure.

## 2017-09-26 ENCOUNTER — OFFICE VISIT (OUTPATIENT)
Dept: PSYCHOLOGY | Facility: CLINIC | Age: 32
End: 2017-09-26
Payer: COMMERCIAL

## 2017-09-26 DIAGNOSIS — F43.22 ADJUSTMENT DISORDER WITH ANXIETY: Primary | ICD-10-CM

## 2017-09-26 PROCEDURE — 90791 PSYCH DIAGNOSTIC EVALUATION: CPT | Performed by: MARRIAGE & FAMILY THERAPIST

## 2017-09-26 ASSESSMENT — ANXIETY QUESTIONNAIRES
IF YOU CHECKED OFF ANY PROBLEMS ON THIS QUESTIONNAIRE, HOW DIFFICULT HAVE THESE PROBLEMS MADE IT FOR YOU TO DO YOUR WORK, TAKE CARE OF THINGS AT HOME, OR GET ALONG WITH OTHER PEOPLE: VERY DIFFICULT
5. BEING SO RESTLESS THAT IT IS HARD TO SIT STILL: SEVERAL DAYS
7. FEELING AFRAID AS IF SOMETHING AWFUL MIGHT HAPPEN: NEARLY EVERY DAY
6. BECOMING EASILY ANNOYED OR IRRITABLE: SEVERAL DAYS
3. WORRYING TOO MUCH ABOUT DIFFERENT THINGS: NEARLY EVERY DAY
2. NOT BEING ABLE TO STOP OR CONTROL WORRYING: NEARLY EVERY DAY
1. FEELING NERVOUS, ANXIOUS, OR ON EDGE: NEARLY EVERY DAY
GAD7 TOTAL SCORE: 16

## 2017-09-26 ASSESSMENT — PATIENT HEALTH QUESTIONNAIRE - PHQ9
5. POOR APPETITE OR OVEREATING: MORE THAN HALF THE DAYS
SUM OF ALL RESPONSES TO PHQ QUESTIONS 1-9: 4

## 2017-09-26 NOTE — PROGRESS NOTES
Adult Intake Structured Interview  Standard Diagnostic Assessment      CLIENT'S NAME: Teresita Hutton  MRN:   [id number]  :   1985  ACCT. NUMBER: [id number]  DATE OF SERVICE: 17      Identifying Information:  Client is a 32 year old, ,  female. Client was referred for counseling by self. 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 Client attended the session alone.       Client's Statement of Presenting Concern:  Client reports the reason for seeking therapy at this time as anxiety. Panic can't breathe, worrying about worst case scenario, death and horrible things happening and has trouble reining it in. More effected at work  Client stated that her symptoms have resulted in the following functional impairments: management of the household and or completion of tasks, relationship(s) and work / vocational responsibilities      History of Presenting Concern:  Client reports that these problem(s) began 2-3 weeks ago when she returned to work. Mother is her  for her son. Client has attempted to resolve these concerns in the past through counseling. Client reports that other professional(s) are involved in providing support / services. Seeing PCP MD for anxiety.       Social History:  Client reported she grew up in [place], [place]. They were the second born of 2 children. This is an intact family and parents remain . Client reported that her childhood was awesome, supportive parents. Client described her current relationships with family of origin as close with all ZO[place].    Client reported a history of 2 committed relationships or marriages. Client has been  for 7 years, together 9 yrs. Client reported having 1 child: age 3 months. Client identified few stable and meaningful social  connections. Client reported that she has not been involved with the legal system.  Client's highest education level was some college. Client did not identify any learning problems. There are ethnic, cultural or Taoism factors that may be relavent for therapy. These factors will be addressed in the Preliminary Treatment plan. Client identified her preferred language to be English. Client reported she does not need the assistance of an  or other support involved in therapy. Modifications will not be used to assist communication in therapy. Client did not serve in the .     Client reports family history includes C.A.D. in her paternal grandfather; DIABETES in her paternal grandfather; Depression in her maternal uncle and mother; Family History Negative in her father and mother; Thyroid Disease in her mother.    Mental Health History:  Client reported the following biological family members or relatives with mental health issues: Mother experienced Anxiety and Depression, Maternal Grandmother experienced Anxiety and Depression and Uncle experienced Depression.  Client previously received the following mental health diagnosis: Anxiety.  Client has received the following mental health services in the past: counseling.  Hospitalizations: None.  Client is not currently receiving any mental health services.      Chemical Health History:  Client reported no family history of chemical health issues. Client has not received chemical dependency treatment in the past. Client is not currently receiving any chemical dependency treatment. Client reports no problems as a result of their drinking / drug use.      Client Reports:  Client reports using alcohol 1 times per month and has .5 glasses of wine at a time. Client first started drinking at age 23.  Client denies using tobacco.  Client denies using marijuana.  Client reports using caffeine 2 times per day and drinks 1 at a time. Client started using  caffeine at age 23.  Client denies using street drugs.  Client denies the non-medical use of prescription or over the counter drugs.    CAGE: None of the patient's responses to the CAGE screening were positive / Negative CAGE score   Based on the negative Cage-Aid score and clinical interview there  are not indications of drug or alcohol abuse.    Discussed the general effects of drugs and alcohol on health and well-being. Therapist gave client printed information about the effects of chemical use on her health and well being.      Significant Losses / Trauma / Abuse / Neglect Issues:  There are indications or report of significant loss, trauma, abuse or neglect issues related to: death of grandfather passed away 1 yr ago.    Issues of possible neglect are not present.      Medical Issues:  Client has had a physical exam to rule out medical causes for current symptoms. Date of last physical exam was within the past year. Client was encouraged to follow up with PCP if symptoms were to develop. The client has a [facility] Primary Care Provider, who is named Nikki Castro.. The client reports not having a psychiatrist. Client reports the following current medical concerns: plantar facsitis, ingrown toenails, allergies. The client denies the presence of chronic or episodic pain. There are not significant nutritional concerns.     Client reports current meds as:   Current Outpatient Prescriptions   Medication Sig     Prenatal MV-Min-Fe Fum-FA-DHA (PRENATAL 1 PO) Take 1 tablet by mouth daily     order for DME Breast pump (Patient not taking: Reported on 9/26/2017)     No current facility-administered medications for this visit.        Client Allergies:  Allergies   Allergen Reactions     Sulfa Drugs      Burning     the following allergies to medications: see above    Medical History:  Past Medical History:   Diagnosis Date     NO ACTIVE PROBLEMS          Medication Adherence:  N/A - Client does not have prescribed  psychiatric medications.    Client was provided recommendation to follow-up with prescribing physician.    Mental Status Assessment:  Appearance:   Appropriate   Eye Contact:   Good   Psychomotor Behavior: Normal   Attitude:   Cooperative   Orientation:   All  Speech   Rate / Production: Normal    Volume:  Normal   Mood:    Anxious   Affect:    Appropriate   Thought Content:  Clear   Thought Form:  Coherent  Logical   Insight:    Fair       Review of Symptoms:  Depression: Energy Concentration Irritability started 2-3 wks ago when returned to work  Nat:  No symptoms  Psychosis: No symptoms  Anxiety: Worries Nervousness Panic can't breathe, worrying about worst case scenario, death and horrible things happening and has trouble reining it in. Begins with going to work and continues to increase throughout the day. More effected at work. started 2-3 wks ago when returned to work   Panic:  Shortness of Breath Tingling Triggers: feeling overwhelmed at work, sometimes at home. Started 2-3 wks ago   Post Traumatic Stress Disorder: No symptoms  Obsessive Compulsive Disorder: No symptoms  Eating Disorder: No symptoms  Oppositional Defiant Disorder: No symptoms  ADD / ADHD: No symptoms  Conduct Disorder: No symptoms      Safety Assessment:    History of Safety Concerns:   Client denied a history of suicidal ideation.    Client denied a history of suicide attempts.    Client denied a history of homicidal ideation.    Client denied a history of self-injurious ideation and behaviors.    Client denied a history of personal safety concerns.    Client denied a history of assaultive behaviors.        Current Safety Concerns:  Client denies current suicidal ideation.    Client denies current homicidal ideation and behaviors.  Client denies current self-injurious ideation and behaviors.    Client denies current concerns for personal safety.      Client reports there are firearms in the house. The firearms are secured in a locked  space.     Plan for Safety and Risk Management:  A safety and risk management plan has been developed including: Client consented to co-developed safety plan, which includes talk with mother or  for support, go for a walk, call for sooner counseling appointment, use crisis line if needed, ED.    Client's Strengths and Limitations:  Client identified the following strengths or resources that will help her succeed in counseling: Episcopal, commitment to health and well being, randell / spirituality, friends / good social support, family support and intelligence. Client identified the following supports: community involvement, family and friends. Things that may interfere with the client's success in counseling include: none.      Diagnostic Criteria:  A. The development of emotional or behavioral symptoms in response to an identifiable stressor(s) occurring within 3 months of the onset of the stressor(s)  B. These symptoms or behaviors are clinically significant, as evidenced by one or both of the following:       - Marked distress that is out of proportion to the severity/intensity of the stressor (with consideration for external context & culture)       - Significant impairment in social, occupational, or other important areas of functioning  C. The stress-related disturbance does not meet criteria for another disorder & is not not an exacerbation of another mental disorder  D. The symptoms do not represent normal bereavement  E. Once the stressor or its consequences have terminated, the symptoms do not persist for more than an additional 6 months       * Adjustment Disorder with Anxiety: The predominant manfestations are symptoms such as nervousness, worry, or jitteriness, or, in children separation anxiety from major attachment figures      Functional Status:  Client's symptoms are causing reduced functional status in the following areas: Activities of Daily Living - anxiety, low energy, diffculty concentrating,  nightmares, psychomotor agitation  Occupational / Vocational - difficulty concentrating  Social / Relational - anxiety, irritability, some conflict      DSM5 Diagnoses: (Sustained by DSM5 Criteria Listed Above)  Diagnoses: Adjustment Disorders  309.24 (F43.22) With anxiety  Psychosocial & Contextual Factors: recent return from work after maternity leave/new baby, occupational, financial,   WHODAS 2.0 (12 item)            This questionnaire asks about difficulties due to health conditions. Health conditions  include  disease or illnesses, other health problems that may be short or long lasting,  injuries, mental health or emotional problems, and problems with alcohol or drugs.                     Think back over the past 30 days and answer these questions, thinking about how much  difficulty you had doing the following activities. For each question, please [place] only  one response.    S1 Standing for long periods such as 30 minutes? Severe =       4   S2 Taking care of household responsibilities? Severe =       4   S3 Learning a new task, for example, learning how to get to a new place? None =         1   S4 How much of a problem do you have joining community activities (for example, festivals, Moravian or other activities) in the same way as anyone else can? None =         1   S5 How much have you been emotionally affected by your health problems? Severe =       4     In the past 30 days, how much difficulty did you have in:   S6 Concentrating on doing something for ten minutes? Moderate =   3   S7 Walking a long distance such as a kilometer (or equivalent)? Severe =       4   S8 Washing your whole body? None =         1   S9 Getting dressed? None =         1   S10 Dealing with people you do not know? None =         1   S11 Maintaining a friendship? None =         1   S12 Your day to day work? Moderate =   3   Total score=28  H1 Overall, in the past 30 days, how many days were these difficulties present? Record  number of days 14-21   H2 In the past 30 days, for how many days were you totally unable to carry out your usual activities or work because of any health condition? Record number of days  0   H3 In the past 30 days, not counting the days that you were totally unable, for how many days did you cut back or reduce your usual activities or work because of any health condition? Record number of days 0     Attendance Agreement:  Client has signed Attendance Agreement:Yes      Collaboration:  The client is receiving treatment / structured support from the following professional(s) / service and treatment. Collaboration will be initiated with: primary care physician.      Preliminary Treatment Plan:  Client's identified Spiritism issues will be addressed by Faith counseling as needed.     services are not indicated.    Modifications to assist communication are not indicated.    The concerns identified by the client will be addressed in therapy.    Initial Treatment will focus on: Anxiety - reduction of anxiety, improve coping skills  Adjustment Difficulties related to: return to work after having first child.    As a preliminary treatment goal, client will experience a reduction in anxiety, will develop more effective coping skills to manage anxiety symptoms, will develop healthy cognitive patterns and beliefs and will increase ability to function adaptively and will develop coping/problem-solving skills to facilitate more adaptive adjustment.    The focus of initial interventions will be to alleviate anxiety, facilitate appropriate expression of feelings, increase ability to function adaptively, increase coping skills, process traumas, provide homework to reinforce skill development, teach CBT skills, teach communication skills, teach conflict management skills, teach relaxation strategies and teach stress mangement techniques.    Referral to another professional/service is not indicated at this time..    A  Release of Information is not needed at this time.    Report to child / adult protection services was NA.    Client will have access to their [facility]' medical record.    Samira Wong  September 26, 2017

## 2017-09-26 NOTE — MR AVS SNAPSHOT
MRN:[id number]                      After Visit Summary   9/26/2017    Teresita Hutton    MRN: [id number]           Visit Information        Provider Department      9/26/2017 10:00 AM Samira Patterson [facility]      Your next 10 appointments already scheduled     Oct 06, 2017 12:00 PM CDT   Return Visit with Samira BLANCA Deleonre   [facility] ([facility])    [address]  [facility] [id number] [phone]            Oct 18, 2017  7:00 AM CDT   Return Visit with Samira Patterson   [facility] ([facility])    [address]  [facility] [id number] [phone]              MyChart Information     [facility] gives you secure access to your electronic health record. If you see a primary care provider, you can also send messages to your care team and make appointments. If you have questions, please call your primary care clinic.  If you do not have a primary care provider, please call [phone] and they will assist you.        Care EveryWhere ID     This is your Care EveryWhere ID. This could be used by other organizations to access your [facility] medical records  [id number]        Equal Access to Services     BRYNN ROSS : Trinh lovingo Sorodney, waaxda luqadaha, qaybta kaalmada adeegyada, taty grey. So [facility] [phone].    ATENCIÓN: Si habla español, tiene a sterling disposición servicios gratuitos de asistencia lingüística. Llamaris al [phone].    We comply with applicable federal civil rights laws and [place] laws. We do not discriminate on the basis of race, color, national origin, age, disability sex, sexual orientation or gender identity.

## 2017-09-26 NOTE — Clinical Note
Nikki- Saw Teresita for counseling intake today. Reporting anxiety and difficulty adjusting to working FT and balancing home/life workload with new baby. I will see her again in a week.   Samira Patterson MA, [facility]

## 2017-09-27 ASSESSMENT — ANXIETY QUESTIONNAIRES: GAD7 TOTAL SCORE: 16

## 2017-10-06 ENCOUNTER — OFFICE VISIT (OUTPATIENT)
Dept: PSYCHOLOGY | Facility: CLINIC | Age: 32
End: 2017-10-06
Payer: COMMERCIAL

## 2017-10-06 DIAGNOSIS — F43.22 ADJUSTMENT DISORDER WITH ANXIETY: Primary | ICD-10-CM

## 2017-10-06 PROCEDURE — 90834 PSYTX W PT 45 MINUTES: CPT | Performed by: MARRIAGE & FAMILY THERAPIST

## 2017-10-06 NOTE — MR AVS SNAPSHOT
MRN:[id number]                      After Visit Summary   10/6/2017    Teresita Hutton    MRN: [id number]           Visit Information        Provider Department      10/6/2017 12:00 PM Samira Patterson [facility]      Your next 10 appointments already scheduled     Oct 18, 2017  7:00 AM CDT   Return Visit with Samira BLANCA DomingoPatterson   [facility] ([facility])    [address]  [facility] [address]   [phone]            Nov 01, 2017 12:00 PM CDT   Return Visit with Samira Patterson   [facility] ([facility])    [address]  [facility] [address] [phone]              MyChart Information     [facility] gives you secure access to your electronic health record. If you see a primary care provider, you can also send messages to your care team and make appointments. If you have questions, please call your primary care clinic.  If you do not have a primary care provider, please call [phone] and they will assist you.        Care EveryWhere ID     This is your Care EveryWhere ID. This could be used by other organizations to access your [facility] medical records  [id number]        Equal Access to Services     BRYNN ROSS : Trinh lovingo Sorodney, waaxda luqadaha, qaybta kaalmada adeegyada, taty grey. So [facility] [phone].    ATENCIÓN: Si habla español, tiene a sterling disposición servicios gratuitos de asistencia lingüística. Llamaris al [phone].    We comply with applicable federal civil rights laws and [place] laws. We do not discriminate on the basis of race, color, national origin, age, disability, sex, sexual orientation, or gender identity.

## 2017-10-06 NOTE — PROGRESS NOTES
Progress Note    Client Name: Teresita Hutton  Date: 10/6/2017         Service Type: Individual      Session Start Time: 12pm  Session End Time: 12:50pm      Session Length: 50 minutes     Session #: 2     Attendees: Client attended alone    Treatment Plan Last Reviewed: NA 2nd session  PHQ-9 / CARLINE-7 : completed last visit     DATA      Progress Since Last Session (Related to Symptoms / Goals / Homework):   Symptoms: anxiety    Homework: NA      Episode of Care Goals: Satisfactory progress - PREPARATION (Decided to change - considering how); Intervened by negotiating a change plan and determining options / strategies for behavior change, identifying triggers, exploring social supports, and working towards setting a date to begin behavior change     Current / Ongoing Stressors and Concerns:   recent return from work after maternity leave/new baby, occupational, financial     Treatment Objective(s) Addressed in This Session:   identify 1-2 initial signs or symptoms of anxiety  ID triggers for anxiety     Intervention:   CBT:     Discussed the following stressors: keeping up with workload, meetings being scheduled over pumping times/missing pumping times, keeping up with housework, not having time for herself at home/meeting son's needs without a break, being away from son during the day, and not living up to her own expectations. Encouraged client to notice anxiety triggers this week and identify whether trigger is avoidable, if not utilize her own coping skills to get through the anxiety triggering event. Discussed bodily cues for anxiety as irritability, and muscle tension. Encouraged client to utilize coping skills when first noticing bodily cues to deescalate anxiety. Encouraged client to talk with boss about making designated pumping times and trying to schedule meetings around them. Encouraged client to use pumping time and commuting time to do things for herself.  Encouraged client to read the book Present Over Perfect.      ASSESSMENT: Current Emotional / Mental Status (status of significant symptoms):   Risk status (Self / Other harm or suicidal ideation)   Client denies current fears or concerns for personal safety.   Client denies current or recent suicidal ideation or behaviors.   Client denies current or recent homicidal ideation or behaviors.   Client denies current or recent self injurious behavior or ideation.   Client denies other safety concerns.   A safety and risk management plan has not been developed at this time, however client was given the after-hours number / 911 should there be a change in any of these risk factors.     Appearance:   Appropriate    Eye Contact:   Good    Psychomotor Behavior: Normal    Attitude:   Cooperative    Orientation:   All   Speech    Rate / Production: Normal     Volume:  Normal    Mood:    Anxious    Affect:    Appropriate    Thought Content:  Clear    Thought Form:  Coherent  Logical    Insight:    Fair      Medication Review:   No current psychiatric medications prescribed   Current Outpatient Prescriptions   Medication     order for DME     Prenatal MV-Min-Fe Fum-FA-DHA (PRENATAL 1 PO)     No current facility-administered medications for this visit.           Medication Compliance:   NA     Changes in Health Issues:   None reported     Chemical Use Review:   Substance Use: Chemical use reviewed, no active concerns identified      Tobacco Use: No current tobacco use.       Collateral Reports Completed:   Not Applicable    PLAN: (Client Tasks / Therapist Tasks / Other)  Encouraged client to notice anxiety triggers this week and identify whether trigger is avoidable, if not utilize her own coping skills to get through the anxiety triggering event.  Encouraged client to utilize coping skills when first noticing bodily cues to deescalate anxiety. Encouraged client to talk with boss about making designated pumping times and trying  to schedule meetings around them. Encouraged client to use pumping time and commuting time to do things for herself. Encouraged client to read the book Present Over Perfect

## 2017-10-18 ENCOUNTER — OFFICE VISIT (OUTPATIENT)
Dept: PSYCHOLOGY | Facility: CLINIC | Age: 32
End: 2017-10-18
Payer: COMMERCIAL

## 2017-10-18 DIAGNOSIS — F43.22 ADJUSTMENT DISORDER WITH ANXIETY: Primary | ICD-10-CM

## 2017-10-18 PROCEDURE — 90834 PSYTX W PT 45 MINUTES: CPT | Performed by: MARRIAGE & FAMILY THERAPIST

## 2017-10-18 NOTE — PROGRESS NOTES
Progress Note    Client Name: Teresita Hutton  Date: 10/18/2017         Service Type: Individual      Session Start Time: 7am  Session End Time: 7:50am      Session Length: 50 minutes     Session #: 3     Attendees: Client attended alone    Treatment Plan Last Reviewed: completed today  PHQ-9 / CARLINE-7 : declined   DATA      Progress Since Last Session (Related to Symptoms / Goals / Homework):   Symptoms: anxiety increased a bit due to situational stressors    Homework: Achieved / completed to satisfaction      Episode of Care Goals: Satisfactory progress - ACTION (Actively working towards change); Intervened by reinforcing change plan / affirming steps taken     Current / Ongoing Stressors and Concerns:   recent return from work after maternity leave/new baby, occupational, financial     Treatment Objective(s) Addressed in This Session:   use at least 2-3 coping skills for anxiety management in the next 2-3 weeks  ID triggers for anxiety     Intervention:   CBT:     Client reports this past week she has been working on noticing triggers for anxiety, and has noticed that when she has a meeting that changes the time she is pumping, or makes pumping scheduling more difficult she is anxious. Also reports being away from son, finances, and getting ready to sell home make her feel very anxious. 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 Processed anxiety that has come up since starting [employer] with  and making the decision to sell their home and move in with her parents for 1-2 years to pay off debt, and save for a new home down payment. Client states financially this is the best choice they can make to be debt free. Gave client handouts on lifestyle changes to reduce anxiety and anxiety reduction techniques: deep breathing, sensate interventions, thought containment/worry time, thought stopping, distraction, visualization/guided imagery, challenging negative thoughts, wise mind vs emotion mind and encouraged daily use.      ASSESSMENT: Current Emotional / Mental Status (status of significant symptoms):   Risk status (Self / Other harm or suicidal ideation)   Client denies current fears or concerns for personal safety.   Client denies current or recent suicidal ideation or behaviors.   Client denies current or recent homicidal ideation or behaviors.   Client denies current or recent self injurious behavior or ideation.   Client denies other safety concerns.   A safety and risk management plan has not been developed at this time, however client was given the after-hours number / 911 should there be a change in any of these risk factors.     Appearance:   Appropriate    Eye Contact:   Good    Psychomotor Behavior: Normal    Attitude:   Cooperative    Orientation:   All   Speech    Rate / Production: Normal     Volume:  Normal    Mood:    Anxious    Affect:    Appropriate    Thought Content:  Clear    Thought Form:  Coherent  Logical    Insight:    Fair      Medication Review:   No current psychiatric medications prescribed   Current Outpatient Prescriptions   Medication     order for DME     Prenatal MV-Min-Fe Fum-FA-DHA (PRENATAL 1 PO)     No current facility-administered medications for this visit.           Medication Compliance:   NA     Changes in Health Issues:   None reported     Chemical Use Review:   Substance Use: Chemical use reviewed, no active concerns identified      Tobacco  Use: No current tobacco use.       Collateral Reports Completed:   Not Applicable    PLAN: (Client Tasks / Therapist Tasks / Other)  Encouraged client to notice anxiety triggers this week and identify whether trigger is avoidable, if not utilize coping skills to get through the anxiety triggering event.  Encouraged client to utilize coping skills when first noticing bodily cues to deescalate anxiety. 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                                                                                                     Treatment Plan    Client's Name: Teresita Hutton  YOB: 1985    Date: 10/18/2017    Diagnoses:            Adjustment Disorders  309.24 (F43.22) With anxiety  Psychosocial & Contextual Factors: recent return from work after maternity leave/new baby, occupational, financial,   WHODAS 2.0 (12 item) 28    Referral / Collaboration:  Collaboration was intiated with PCP MD.    Anticipated number of session or this episode of care: 6-10      MeasurableTreatment Goal(s) related to diagnosis / functional impairment(s)  Goal 1: Client will decrease anxiety as evidenced by GAD7 score of  4 or under within the next 3 months. Initial scorin2017:  GAD7:16.     I will know I've met my goal when I have less anxiety and no panic.        Objective #A (Client Action)   Continued - Date(s): 10/18/2017  Client will identify 1-2 initial signs or symptoms of anxiety and utilize anxiety reduction techniques daily to decrease anxiety over the next month.Current Baseline is sporadic use. Client will ID triggers for anxiety and work towards decreasing reactivity to or eliminating stressor if possible. Client will develop more effective coping skills to manage  anxiety symptoms, will develop  healthy cognitive patterns and beliefs, will increase ability to function adaptively and will continue to take medications as prescribed / participate in supportive activities.     Intervention(s)   Therapist will teach client how to ID body cues for anxiety, anxiety reduction techniques, how to ID triggers for anxiety- decrease reactivity/eliminate, lifestyle changes to reduce anxiety, communication skills, explore cognitive beliefs and help client to develop healthy cognitive patterns and beliefs.  Client has reviewed and agreed to the above plan.    Samira Wong  October 18, 2017

## 2017-10-18 NOTE — MR AVS SNAPSHOT
MRN:[id number]                      After Visit Summary   10/18/2017    Teresita Hutton    MRN: [id number]           Visit Information        Provider Department      10/18/2017 7:00 AM Samira Patterson [facility]      Your next 10 appointments already scheduled     Nov 01, 2017 12:00 PM CDT   Return Visit with Samira Patterson   [facility] ([facility])    [address]  [facility] [address]   [phone]            Nov 15, 2017 12:00 PM CST   Return Visit with Samira Patterson   [facility] ([facility])    [address]  [facility] [address]   [phone]              MyChart Information     [employer]ilit[employer]hart gives you secure access to your electronic health record. If you see a primary care provider, you can also send messages to your care team and make appointments. If you have questions, please call your primary care clinic.  If you do not have a primary care provider, please call [phone] and they will assist you.        Care EveryWhere ID     This is your Care EveryWhere ID. This could be used by other organizations to access your [place] medical records  [id number]        Equal Access to Services     BRYNN ROSS : Trinh Jurado, waaxda luqadaha, qaybta kaalmada adekassiyada, taty grey. So [facility] [phone].    ATENCIÓN: Si habla español, tiene a stelring disposición servicios gratuitos de asistencia lingüística. Llamaris al [phone].    We comply with applicable federal civil rights laws and [place] laws. We do not discriminate on the basis of race, color, national origin, age, disability, sex, sexual orientation, or gender identity.

## 2017-10-25 ENCOUNTER — TELEPHONE (OUTPATIENT)
Dept: PSYCHOLOGY | Facility: CLINIC | Age: 32
End: 2017-10-25

## 2017-10-25 NOTE — TELEPHONE ENCOUNTER
----- Message from Priya Franco sent at 10/25/2017  3:11 PM CDT -----  Regarding: phone message  Teresita Hogan's  called asking if you might be able to call him. His name is Avel and there is a signed authorization to discuss protected health information for him in Epic.    He is asking about the possibility of a medication that she might be able to take when she has a panic attack that is fast acting but says she is nursing.    I thought she might need an appointment with her medical provider to discuss this and I mentioned that to him but he is wondering if you might be able to call him.    His phone number is [phone].      Provider called out to , Maxime. Reports concerns that client is having panic attacks in which she is short of breath prior to leaving for work in the morning and wondering if there is a short acting anxiety medication she could take while breast feeding. Encouraged follow up with PCP MD about medical concerns as well as to discuss options for psychotropic medications. Gave suggestions for available area providers. Encouraged  to consider coming into next session to discuss concerns and how he can support wife.

## 2017-11-06 ENCOUNTER — OFFICE VISIT (OUTPATIENT)
Dept: FAMILY MEDICINE | Facility: OTHER | Age: 32
End: 2017-11-06
Payer: COMMERCIAL

## 2017-11-06 VITALS
HEIGHT: 64 IN | TEMPERATURE: 98 F | DIASTOLIC BLOOD PRESSURE: 80 MMHG | HEART RATE: 98 BPM | SYSTOLIC BLOOD PRESSURE: 114 MMHG | RESPIRATION RATE: 16 BRPM | OXYGEN SATURATION: 98 % | BODY MASS INDEX: 36.19 KG/M2 | WEIGHT: 212 LBS

## 2017-11-06 DIAGNOSIS — Z23 NEED FOR PROPHYLACTIC VACCINATION AND INOCULATION AGAINST INFLUENZA: Primary | ICD-10-CM

## 2017-11-06 DIAGNOSIS — F43.22 ADJUSTMENT DISORDER WITH ANXIETY: ICD-10-CM

## 2017-11-06 DIAGNOSIS — F41.9 ANXIETY: ICD-10-CM

## 2017-11-06 DIAGNOSIS — R63.5 ABNORMAL WEIGHT GAIN: ICD-10-CM

## 2017-11-06 LAB
ALBUMIN SERPL-MCNC: 4.1 G/DL (ref 3.4–5)
ALP SERPL-CCNC: 222 U/L (ref 40–150)
ALT SERPL W P-5'-P-CCNC: 19 U/L (ref 0–50)
ANION GAP SERPL CALCULATED.3IONS-SCNC: 9 MMOL/L (ref 3–14)
AST SERPL W P-5'-P-CCNC: 18 U/L (ref 0–45)
BILIRUB SERPL-MCNC: 0.2 MG/DL (ref 0.2–1.3)
BUN SERPL-MCNC: 17 MG/DL (ref 7–30)
CALCIUM SERPL-MCNC: 9.1 MG/DL (ref 8.5–10.1)
CHLORIDE SERPL-SCNC: 102 MMOL/L (ref 94–109)
CO2 SERPL-SCNC: 30 MMOL/L (ref 20–32)
CREAT SERPL-MCNC: 0.88 MG/DL (ref 0.52–1.04)
ERYTHROCYTE [DISTWIDTH] IN BLOOD BY AUTOMATED COUNT: 12.9 % (ref 10–15)
GFR SERPL CREATININE-BSD FRML MDRD: 75 ML/MIN/1.7M2
GLUCOSE SERPL-MCNC: 105 MG/DL (ref 70–99)
HCT VFR BLD AUTO: 40.1 % (ref 35–47)
HGB BLD-MCNC: 13.7 G/DL (ref 11.7–15.7)
MCH RBC QN AUTO: 30 PG (ref 26.5–33)
MCHC RBC AUTO-ENTMCNC: 34.2 G/DL (ref 31.5–36.5)
MCV RBC AUTO: 88 FL (ref 78–100)
PLATELET # BLD AUTO: 291 10E9/L (ref 150–450)
POTASSIUM SERPL-SCNC: 3.9 MMOL/L (ref 3.4–5.3)
PROT SERPL-MCNC: 8.3 G/DL (ref 6.8–8.8)
RBC # BLD AUTO: 4.56 10E12/L (ref 3.8–5.2)
SODIUM SERPL-SCNC: 141 MMOL/L (ref 133–144)
WBC # BLD AUTO: 8.4 10E9/L (ref 4–11)

## 2017-11-06 PROCEDURE — 80053 COMPREHEN METABOLIC PANEL: CPT | Performed by: FAMILY MEDICINE

## 2017-11-06 PROCEDURE — 36415 COLL VENOUS BLD VENIPUNCTURE: CPT | Performed by: FAMILY MEDICINE

## 2017-11-06 PROCEDURE — 85027 COMPLETE CBC AUTOMATED: CPT | Performed by: FAMILY MEDICINE

## 2017-11-06 PROCEDURE — 99213 OFFICE O/P EST LOW 20 MIN: CPT | Performed by: FAMILY MEDICINE

## 2017-11-06 ASSESSMENT — ANXIETY QUESTIONNAIRES
7. FEELING AFRAID AS IF SOMETHING AWFUL MIGHT HAPPEN: MORE THAN HALF THE DAYS
2. NOT BEING ABLE TO STOP OR CONTROL WORRYING: SEVERAL DAYS
GAD7 TOTAL SCORE: 9
6. BECOMING EASILY ANNOYED OR IRRITABLE: MORE THAN HALF THE DAYS
GAD7 TOTAL SCORE: 9
5. BEING SO RESTLESS THAT IT IS HARD TO SIT STILL: NOT AT ALL
7. FEELING AFRAID AS IF SOMETHING AWFUL MIGHT HAPPEN: MORE THAN HALF THE DAYS
GAD7 TOTAL SCORE: 9
3. WORRYING TOO MUCH ABOUT DIFFERENT THINGS: SEVERAL DAYS
4. TROUBLE RELAXING: SEVERAL DAYS
1. FEELING NERVOUS, ANXIOUS, OR ON EDGE: MORE THAN HALF THE DAYS

## 2017-11-06 ASSESSMENT — PATIENT HEALTH QUESTIONNAIRE - PHQ9
SUM OF ALL RESPONSES TO PHQ QUESTIONS 1-9: 7
10. IF YOU CHECKED OFF ANY PROBLEMS, HOW DIFFICULT HAVE THESE PROBLEMS MADE IT FOR YOU TO DO YOUR WORK, TAKE CARE OF THINGS AT HOME, OR GET ALONG WITH OTHER PEOPLE: SOMEWHAT DIFFICULT
SUM OF ALL RESPONSES TO PHQ QUESTIONS 1-9: 7

## 2017-11-06 ASSESSMENT — PAIN SCALES - GENERAL: PAINLEVEL: NO PAIN (0)

## 2017-11-06 NOTE — NURSING NOTE
"Chief Complaint   Patient presents with     Anxiety     Depression     Health Maintenance       Initial /80 (BP Location: Left arm, Patient Position: Chair, Cuff Size: Adult Large)  Pulse 98  Temp 98  F (36.7  C) (Oral)  Resp 16  Ht 5' 3.75\" (1.619 m)  Wt 212 lb (96.2 kg)  LMP 09/03/2016 (Exact Date)  SpO2 98%  BMI 36.68 kg/m2 Estimated body mass index is 36.68 kg/(m^2) as calculated from the following:    Height as of this encounter: 5' 3.75\" (1.619 m).    Weight as of this encounter: 212 lb (96.2 kg).  Medication Reconciliation: complete   Faith Hutton CMA (AAMA)      "

## 2017-11-06 NOTE — PROGRESS NOTES
SUBJECTIVE:                                                    Teresita Hutton is a 32 year old female who presents to clinic today for the following health issues:      History of Present Illness     Depression & Anxiety Follow-up:     Depression/Anxiety:  Depression & Anxiety    Status since last visit::  Worsened    Other associated symptoms of depression and anxiety::  YES    Significant life event::  YES    Current substance use::  None    Recent first pregnancy.    Answers for HPI/ROS submitted by the patient on 11/6/2017   If you checked off any problems, how difficult have these problems made it for you to do your work, take care of things at home, or get along with other people?: Somewhat difficult  PHQ9 TOTAL SCORE: 7  CARLINE 7 TOTAL SCORE: 9    -------------------------------------    Problem list and histories reviewed & adjusted, as indicated.  Additional history: as documented        Patient Active Problem List   Diagnosis     Rh negative status in sears pregnancy in third trimester     Encounter for supervision of other normal pregnancy in third trimester     Adjustment disorder with anxiety     Past Surgical History:   Procedure Laterality Date     wisdom teeth         Social History   Substance Use Topics     Smoking status: Never Smoker     Smokeless tobacco: Never Used      Comment: no smoking in the home     Alcohol use No      Comment: a beer a week     Family History   Problem Relation Age of Onset     DIABETES Paternal Grandfather      C.A.D. Paternal Grandfather      Family History Negative Mother      Depression Mother      Thyroid Disease Mother      Family History Negative Father      Depression Maternal Uncle          Current Outpatient Prescriptions   Medication Sig Dispense Refill     order for DME Breast pump 1 each 0     Prenatal MV-Min-Fe Fum-FA-DHA (PRENATAL 1 PO) Take 1 tablet by mouth daily       Allergies   Allergen Reactions     Sulfa Drugs      Burning     BP Readings  "from Last 3 Encounters:   11/06/17 114/80   09/08/17 110/70   07/27/17 124/80    Wt Readings from Last 3 Encounters:   11/06/17 212 lb (96.2 kg)   09/12/17 202 lb (91.6 kg)   09/08/17 202 lb (91.6 kg)                  Labs reviewed in EPIC        ROS:  Constitutional, HEENT, cardiovascular, pulmonary, GI, , musculoskeletal, neuro, skin, endocrine and psych systems are negative, except as otherwise noted.      OBJECTIVE:   /80 (BP Location: Left arm, Patient Position: Chair, Cuff Size: Adult Large)  Pulse 98  Temp 98  F (36.7  C) (Oral)  Resp 16  Ht 5' 3.75\" (1.619 m)  Wt 212 lb (96.2 kg)  LMP 09/03/2016 (Exact Date)  SpO2 98%  BMI 36.68 kg/m2  Body mass index is 36.68 kg/(m^2).   Physical Exam   Constitutional: She is oriented to person, place, and time. She appears well-developed and well-nourished.   HENT:   Head: Normocephalic and atraumatic.   Eyes: EOM are normal.   Cardiovascular: Normal rate, regular rhythm and normal heart sounds.    Pulmonary/Chest: Effort normal and breath sounds normal.   Neurological: She is alert and oriented to person, place, and time.   Psychiatric: She has a normal mood and affect. Her behavior is normal. Judgment and thought content normal.         Diagnostic Test Results:  none     ASSESSMENT/PLAN:     Problem List Items Addressed This Visit     Adjustment disorder with anxiety     Adjustment disorder   Worsening symptoms   R/o metabolic and hormonal causes  Reviewed Phq-9 and GAD7 - will monitor  Recheck in 1 month for follow up           Other Visit Diagnoses     Need for prophylactic vaccination and inoculation against influenza    -  Primary    Anxiety        Relevant Orders    CBC with platelets    Comprehensive metabolic panel (BMP + Alb, Alk Phos, ALT, AST, Total. Bili, TP)    TSH with free T4 reflex    Abnormal weight gain        Relevant Orders    CBC with platelets    Comprehensive metabolic panel (BMP + Alb, Alk Phos, ALT, AST, Total. Bili, TP)    TSH " with free T4 reflex             Stephanie Campos MD  [facility]

## 2017-11-06 NOTE — ASSESSMENT & PLAN NOTE
Adjustment disorder   Worsening symptoms   R/o metabolic and hormonal causes  Reviewed Phq-9 and GAD7 - will monitor  Recheck in 1 month for follow up

## 2017-11-06 NOTE — MR AVS SNAPSHOT
After Visit Summary   11/6/2017    Teresita Hutton    MRN: [id number]           Patient Information     Date Of Birth          1985        Visit Information        Provider Department      11/6/2017 2:20 PM Stephanie Campos MD [facility]        Today's Diagnoses     Need for prophylactic vaccination and inoculation against influenza    -  1    Anxiety        Abnormal weight gain        Adjustment disorder with anxiety           Follow-ups after your visit        Your next 10 appointments already scheduled     Nov 15, 2017 12:00 PM CST   Return Visit with Samira Patterson   [facility]    [address]  [facility] [address]   [phone]              Future tests that were ordered for you today     Open Future Orders        Priority Expected Expires Ordered    TSH with free T4 reflex Routine  11/6/2018 11/6/2017            Who to contact     If you have questions or need follow up information about today's clinic visit or your schedule please contact [facility] directly at [phone].  Normal or non-critical lab and imaging results will be communicated to you by [employer], letter or phone within 4 business days after the clinic has received the results. If you do not hear from us within 7 days, please contact the clinic through [employer] or phone. If you have a critical or abnormal lab result, we will notify you by phone as soon as possible.  Submit refill requests through [employer]) or call your pharmacy and they will forward the refill request to us. Please allow 3 business days for your refill to be completed.          Additional Information About Your Visit        [employer]art Information     [employer] gives you secure access to your electronic health record. If you see a primary care provider, you can also send messages to your care team and make appointments. If you have questions, please call your primary  "care clinic.  If you do not have a primary care provider, please call [phone] and they will assist you.        Care EveryWhere ID     This is your Care EveryWhere ID. This could be used by other organizations to access your [place] medical records  [id number]        Your Vitals Were     Pulse Temperature Respirations Height Last Period Pulse Oximetry    98 98  F (36.7  C) (Oral) 16 5' 3.75\" (1.619 m) 09/03/2016 (Exact Date) 98%    BMI (Body Mass Index)                   36.68 kg/m2            Blood Pressure from Last 3 Encounters:   11/06/17 114/80   09/08/17 110/70   07/27/17 124/80    Weight from Last 3 Encounters:   11/06/17 212 lb (96.2 kg)   09/12/17 202 lb (91.6 kg)   09/08/17 202 lb (91.6 kg)              We Performed the Following     CBC with platelets     Comprehensive metabolic panel (BMP + Alb, Alk Phos, ALT, AST, Total. Bili, TP)        Primary Care Provider Office Phone # Fax #    Nikki Castro [phone]       [address] [facility] [address]        Equal Access to Services     NI ROSS AH: Hadii aad willian hadjoslyno Soomaali, waaxda luqadaha, qaybta kaalmada adeegyada, taty grey. So [facility] [phone].    ATENCIÓN: Si habla español, tiene a sterling disposición servicios gratuitos de asistencia lingüística. [facility] [phone].    We comply with applicable federal civil rights laws and [place] laws. We do not discriminate on the basis of race, color, national origin, age, disability, sex, sexual orientation, or gender identity.            Thank you!     Thank you for choosing [facility]  for your care. Our goal is always to provide you with excellent care. Hearing back from our patients is one way we can continue to improve our services. Please take a few minutes to complete the written survey that you may receive in the mail after your visit with us. Thank you!             Your Updated Medication List - Protect others around " you: Learn how to safely use, store and throw away your medicines at www.disposemymeds.org.          This list is accurate as of: 11/6/17  2:58 PM.  Always use your most recent med list.                   Brand Name Dispense Instructions for use Diagnosis    order for DME     1 each    Breast pump    Encounter for supervision of other normal pregnancy in second trimester, Rh negative status in sears pregnancy in second trimester       PRENATAL 1 PO      Take 1 tablet by mouth daily

## 2017-11-07 ASSESSMENT — PATIENT HEALTH QUESTIONNAIRE - PHQ9: SUM OF ALL RESPONSES TO PHQ QUESTIONS 1-9: 7

## 2017-11-07 ASSESSMENT — ANXIETY QUESTIONNAIRES: GAD7 TOTAL SCORE: 9

## 2017-11-09 ENCOUNTER — TELEPHONE (OUTPATIENT)
Dept: LAB | Facility: OTHER | Age: 32
End: 2017-11-09

## 2017-11-09 DIAGNOSIS — R53.83 FATIGUE, UNSPECIFIED TYPE: Primary | ICD-10-CM

## 2017-11-09 DIAGNOSIS — F41.9 ANXIETY: ICD-10-CM

## 2017-11-09 DIAGNOSIS — R63.5 ABNORMAL WEIGHT GAIN: ICD-10-CM

## 2017-11-09 DIAGNOSIS — R74.8 ELEVATED ALKALINE PHOSPHATASE LEVEL: ICD-10-CM

## 2017-11-09 DIAGNOSIS — R53.83 FATIGUE, UNSPECIFIED TYPE: ICD-10-CM

## 2017-11-09 LAB
FERRITIN SERPL-MCNC: 44 NG/ML (ref 12–150)
GGT SERPL-CCNC: 10 U/L (ref 0–40)
T4 FREE SERPL-MCNC: 0.56 NG/DL (ref 0.76–1.46)
TSH SERPL DL<=0.005 MIU/L-ACNC: 36.55 MU/L (ref 0.4–4)
VIT B12 SERPL-MCNC: 678 PG/ML (ref 193–986)

## 2017-11-09 PROCEDURE — 36415 COLL VENOUS BLD VENIPUNCTURE: CPT | Performed by: FAMILY MEDICINE

## 2017-11-09 PROCEDURE — 82728 ASSAY OF FERRITIN: CPT | Performed by: FAMILY MEDICINE

## 2017-11-09 PROCEDURE — 84443 ASSAY THYROID STIM HORMONE: CPT | Performed by: FAMILY MEDICINE

## 2017-11-09 PROCEDURE — 82306 VITAMIN D 25 HYDROXY: CPT | Performed by: FAMILY MEDICINE

## 2017-11-09 PROCEDURE — 82607 VITAMIN B-12: CPT | Performed by: FAMILY MEDICINE

## 2017-11-09 PROCEDURE — 84439 ASSAY OF FREE THYROXINE: CPT | Performed by: FAMILY MEDICINE

## 2017-11-09 PROCEDURE — 82977 ASSAY OF GGT: CPT | Performed by: FAMILY MEDICINE

## 2017-11-09 NOTE — TELEPHONE ENCOUNTER
TSH with T4 was ordered - please review/sign labs that are pending, if appropriate.  Nay Cardenas, CMA

## 2017-11-09 NOTE — TELEPHONE ENCOUNTER
Patient cane in today for labs.  She stated that she needed FREE T4, T3, FERITIN ,VITAMIN D,& VITAMIN 12  [place] [facility] orders if needed   Thank you  Geeta Abbott (MLT) [facility]

## 2017-11-09 NOTE — TELEPHONE ENCOUNTER
Signed off on the orders. The intial labs are all normal except one of the liver enzyme which was very mildly elevated. I add labs to evalaute this further. I will get back to her once I have all the results

## 2017-11-10 ENCOUNTER — TELEPHONE (OUTPATIENT)
Dept: FAMILY MEDICINE | Facility: OTHER | Age: 32
End: 2017-11-10

## 2017-11-10 DIAGNOSIS — E03.9 HYPOTHYROIDISM, UNSPECIFIED TYPE: Primary | ICD-10-CM

## 2017-11-10 LAB — DEPRECATED CALCIDIOL+CALCIFEROL SERPL-MC: 32 UG/L (ref 20–75)

## 2017-11-10 RX ORDER — LEVOTHYROXINE SODIUM 75 UG/1
75 TABLET ORAL DAILY
Qty: 30 TABLET | Refills: 1 | Status: SHIPPED | OUTPATIENT
Start: 2017-11-10 | End: 2018-11-06

## 2017-11-10 NOTE — TELEPHONE ENCOUNTER
Please inform pt that the labs show low functioning thyroid. She needs to be on throid medication. I sent a script to the [facility] pharmacy. Have her pick it up. Needs recheck in 4 wks

## 2017-11-10 NOTE — TELEPHONE ENCOUNTER
Stephanie Campos MD  [facility]                   Please inform patient that her vitamin D levels, vitamin B12, ferritin, liver enzymes are completely normal.

## 2017-11-10 NOTE — LETTER
[address]  [facility] [address]  [phone]        November 14, 2017    Teresita Hutton  [address] [facility] [address]          Dear Teresita,    We have been unable to reach you by phone. Your Vitamin D levels, Vitamin B12, Ferritin, and liver enzymes are all normal. Your thyroid level is low and you need to start on a thyroid medication. The medication Levothyroxine was sent to your pharmacy. Please call to schedule a repeat lab in 4 weeks.     Sincerely,        Radha Campos MD

## 2017-11-13 ENCOUNTER — TELEPHONE (OUTPATIENT)
Dept: FAMILY MEDICINE | Facility: OTHER | Age: 32
End: 2017-11-13

## 2017-11-13 DIAGNOSIS — E03.9 HYPOTHYROIDISM, UNSPECIFIED TYPE: Primary | ICD-10-CM

## 2017-11-13 NOTE — TELEPHONE ENCOUNTER
Patient called in requesting a referral for an endocrinologist at the [facility]. Please fax referral and records to: [phone]. You may also generate a referral to another endocriniologiust that you may have in mind. Please update patient.

## 2017-11-13 NOTE — TELEPHONE ENCOUNTER
Please inform pt that I can take care of her thyroid problems and she does not have to see an endocrinologist for this. If she still wishes to see them ,  I placed the orders. You can have them fax the referral

## 2017-11-13 NOTE — TELEPHONE ENCOUNTER
I do not see any mention of an endocrinologist in RKs most recent visit, will route to RK to review/advise

## 2017-11-13 NOTE — TELEPHONE ENCOUNTER
I had not recommended her to see endocrine.  Looks like she recently saw RK, perhaps they had discussed this.  Nikki Castro MD

## 2017-11-13 NOTE — TELEPHONE ENCOUNTER
I do not see any notes about a referral from AE for pt to see an endocrinologist. Will route to AE.   Lillian Zhao, CMA

## 2017-11-14 ENCOUNTER — OFFICE VISIT (OUTPATIENT)
Dept: FAMILY MEDICINE | Facility: OTHER | Age: 32
End: 2017-11-14
Payer: COMMERCIAL

## 2017-11-14 ENCOUNTER — OFFICE VISIT (OUTPATIENT)
Dept: PODIATRY | Facility: OTHER | Age: 32
End: 2017-11-14
Payer: COMMERCIAL

## 2017-11-14 VITALS
WEIGHT: 212 LBS | DIASTOLIC BLOOD PRESSURE: 78 MMHG | SYSTOLIC BLOOD PRESSURE: 122 MMHG | HEART RATE: 76 BPM | OXYGEN SATURATION: 98 % | TEMPERATURE: 97.8 F | HEIGHT: 64 IN | RESPIRATION RATE: 16 BRPM | BODY MASS INDEX: 36.19 KG/M2

## 2017-11-14 VITALS — WEIGHT: 212 LBS | BODY MASS INDEX: 36.19 KG/M2 | HEIGHT: 64 IN | TEMPERATURE: 97.6 F

## 2017-11-14 DIAGNOSIS — E03.9 HYPOTHYROIDISM, UNSPECIFIED TYPE: Primary | ICD-10-CM

## 2017-11-14 DIAGNOSIS — L60.0 INGROWING NAIL: Primary | ICD-10-CM

## 2017-11-14 DIAGNOSIS — Z23 NEED FOR PROPHYLACTIC VACCINATION AND INOCULATION AGAINST INFLUENZA: ICD-10-CM

## 2017-11-14 PROCEDURE — 99213 OFFICE O/P EST LOW 20 MIN: CPT | Performed by: FAMILY MEDICINE

## 2017-11-14 PROCEDURE — 84445 ASSAY OF TSI GLOBULIN: CPT | Mod: 90 | Performed by: FAMILY MEDICINE

## 2017-11-14 PROCEDURE — 11750 EXCISION NAIL&NAIL MATRIX: CPT | Mod: TA | Performed by: PODIATRIST

## 2017-11-14 PROCEDURE — 99000 SPECIMEN HANDLING OFFICE-LAB: CPT | Performed by: FAMILY MEDICINE

## 2017-11-14 PROCEDURE — 36415 COLL VENOUS BLD VENIPUNCTURE: CPT | Performed by: FAMILY MEDICINE

## 2017-11-14 PROCEDURE — 86800 THYROGLOBULIN ANTIBODY: CPT | Performed by: FAMILY MEDICINE

## 2017-11-14 PROCEDURE — 11750 EXCISION NAIL&NAIL MATRIX: CPT | Mod: T5 | Performed by: PODIATRIST

## 2017-11-14 PROCEDURE — 86376 MICROSOMAL ANTIBODY EACH: CPT | Performed by: FAMILY MEDICINE

## 2017-11-14 ASSESSMENT — PAIN SCALES - GENERAL
PAINLEVEL: NO PAIN (0)
PAINLEVEL: NO PAIN (0)

## 2017-11-14 NOTE — MR AVS SNAPSHOT
After Visit Summary   11/14/2017    Teresita Hutton    MRN: [id number]           Patient Information     Date Of Birth          1985        Visit Information        Provider Department      11/14/2017 1:45 PM Marcelo Ortiz DPM [facility]        Today's Diagnoses     Ingrowing nail    -  1      Care Instructions    INGROWN TOENAIL POSTOPERATIVE INSTRUCTIONS   (Nail avulsion or chemical matrixectomy)   1.  Go directly home and elevate the affected foot on one or two pillows for the remainder of the day & evening. Your toe may stay numb for 2-8 hours.   2.  Take Tylenol, ibuprofen or another anti-inflammatory as needed for pain. Most people require no pain medication.  3.  Use oral antibiotic if that was prescribed at your doctor visit. Take the entire prescription even if your symptoms have improved.   4.  Keep dressing dry and intact the day of the procedure. The morning after the procedure, remove entire dressing and soak or wash the affected area in lukewarm water for 5-10 minutes.  You may add Epsom salt to soothe the area and help it become drier. Do this twice a day or more until the surgical site remains dry without drainage.  This may take 1-2 weeks if a small part of your nail was removed or 4-8 weeks if the entire nail was removed. You may count showering or bathing as one soak.  After each soak, pat the area dry with a clean towel or gauze and then allow to air dry for a few minutes. Then cover with a cloth or fabric bandaid.  Avoid ointments as they keep the area to wet. Encourage this wound to become dry with a scab.   5.  You may walk and pursue everyday activities as tolerated with either an open toe shoe or cut-out shoe as needed. You may wear regular roomy shoes if no pain is noted.  No swimming in the lake, river, pool or hot tub until the wound has become dry.   7.  Watch for any signs or symptoms of infection such as: red streaks going up the foot/leg,  swelling, pus or foul odor. For patients that have had a permanent phenol procedure, the toe will drain longer and may look red or sore for a half an inch around the wound similar to infection because it is a chemical burn.  Please call with questions.  8.  You may call my office in 1-2 weeks if the surgical site is not becoming drier or if other complications occur.   9.  There is 5-10% chance of complications such as infection or formation of another nail or a thick scared nail.              Follow-ups after your visit        Your next 10 appointments already scheduled     Nov 29, 2017  7:00 AM CST   Return Visit with Samira Patterson   [facility] ([facility])    [address]  [place] [address]   [phone]            Dec 13, 2017  7:45 AM CST   LAB with NL LAB [facility] ([facility])    [address]  [place] [facility] [place] [address]   [phone]           Please do not eat 10-12 hours before your appointment if you are coming in fasting for labs on lipids, cholesterol, or glucose (sugar). This does not apply to pregnant women. Water, hot tea and black coffee (with nothing added) are okay. Do not drink other fluids, diet soda or chew gum.            Dec 14, 2017  4:00 PM CST   Office Visit with Stephanie Campos MD   [facility] ([facility])    [address]  [place] [address]   [phone]           Bring a current list of meds and any records pertaining to this visit. For Physicals, please bring immunization records and any forms needing to be filled out. Please arrive 10 minutes early to complete paperwork.              Future tests that were ordered for you today     Open Future Orders        Priority Expected Expires Ordered    TSH with free T4 reflex Routine 12/13/2017 11/14/2018 11/14/2017            Who to contact     If you have questions or need follow up information about  "today's clinic visit or your schedule please contact [facility] directly at [phone].  Normal or non-critical lab and imaging results will be communicated to you by MyChart, letter or phone within 4 business days after the clinic has received the results. If you do not hear from us within 7 days, please contact the clinic through [employer] or phone. If you have a critical or abnormal lab result, we will notify you by phone as soon as possible.  Submit refill requests through [employer] or call your pharmacy and they will forward the refill request to us. Please allow 3 business days for your refill to be completed.          Additional Information About Your Visit        [employer] Information     [employer] gives you secure access to your electronic health record. If you see a primary care provider, you can also send messages to your care team and make appointments. If you have questions, please call your primary care clinic.  If you do not have a primary care provider, please call [phone] and they will assist you.        Care EveryWhere ID     This is your Care EveryWhere ID. This could be used by other organizations to access your [facility] medical records  [id number]        Your Vitals Were     Temperature Height Last Period BMI (Body Mass Index)          97.6  F (36.4  C) (Temporal) 5' 3.75\" (1.619 m) 09/03/2016 (Exact Date) 36.68 kg/m2         Blood Pressure from Last 3 Encounters:   11/14/17 122/78   11/06/17 114/80   09/08/17 110/70    Weight from Last 3 Encounters:   11/14/17 212 lb (96.2 kg)   11/14/17 212 lb (96.2 kg)   11/06/17 212 lb (96.2 kg)              We Performed the Following     REMOVAL NAIL/NAIL BED, PARTIAL OR COMPLETE        Primary Care Provider Office Phone # Fax #    Nikki Castro [phone]       [address] [address]        Equal Access to Services     BRYNN ROSS AH: Trinh Jurado, wavinhda markel, qaybta hiram mckeon, " taty robertsonkassi baca'aan ah. So [facility] [phone].    ATENCIÓN: Si habla shira, tiene a sterling disposición servicios gratuitos de asistencia lingüística. Drew al [phone].    We comply with applicable federal civil rights laws and [place] laws. We do not discriminate on the basis of race, color, national origin, age, disability, sex, sexual orientation, or gender identity.            Thank you!     Thank you for choosing [facility]  for your care. Our goal is always to provide you with excellent care. Hearing back from our patients is one way we can continue to improve our services. Please take a few minutes to complete the written survey that you may receive in the mail after your visit with us. Thank you!             Your Updated Medication List - Protect others around you: Learn how to safely use, store and throw away your medicines at www.disposemymeds.org.          This list is accurate as of: 11/14/17  2:17 PM.  Always use your most recent med list.                   Brand Name Dispense Instructions for use Diagnosis    levothyroxine 75 MCG tablet    SYNTHROID/LEVOTHROID    30 tablet    Take 1 tablet (75 mcg) by mouth daily    Hypothyroidism, unspecified type       order for DME     1 each    Breast pump    Encounter for supervision of other normal pregnancy in second trimester, Rh negative status in sears pregnancy in second trimester       PRENATAL 1 PO      Take 1 tablet by mouth daily

## 2017-11-14 NOTE — PROGRESS NOTES
Chief Complaint   Patient presents with     RECHECK     B/L plantar foot pain, Ingrown lateral B/L great toenails; LOV 9/12/2017     Procedure     Matrixectomy lateral Left and Right great toenail        Weight management plan: Patient was referred to their PCP to discuss a diet and exercise plan.     HPI:  Teresita Hutton is a 32 year old female who is seen in consultation at the request of self.    Pt presents for eval of:   (Onset, Location, L/R, Character, Treatments, Injury if yes)    PATIENT HISTORY:  Teresita Hutton is a 32 year old female who presents to clinic complaining of chronically ingrown nail and aching pain and redness along the borders.  Present off and on for months.  Patient has attempted soaking and oral antibiotics in the past.  This has been ingrown or treated multiple times. They are requesting permanent treatment.    Review of Systems:  Patient denies fever, chills, rash, wound, stiffness, limping, numbness, weakness, heart burn, blood in stool, chest pain with activity, calf pain when walking, shortness of breath with activity, chronic cough, easy bleeding/bruising, swelling of ankles, excessive thirst, fatigue, depression, anxiety.  Pt admits to the symptoms noted in history above.     PAST MEDICAL HISTORY:   Past Medical History:   Diagnosis Date     NO ACTIVE PROBLEMS         PAST SURGICAL HISTORY:   Past Surgical History:   Procedure Laterality Date     wisdom teeth          MEDICATIONS:   Current Outpatient Prescriptions:      levothyroxine (SYNTHROID/LEVOTHROID) 75 MCG tablet, Take 1 tablet (75 mcg) by mouth daily, Disp: 30 tablet, Rfl: 1     order for DME, Breast pump, Disp: 1 each, Rfl: 0     Prenatal MV-Min-Fe Fum-FA-DHA (PRENATAL 1 PO), Take 1 tablet by mouth daily, Disp: , Rfl:      ALLERGIES:    Allergies   Allergen Reactions     Sulfa Drugs      Burning        SOCIAL HISTORY:   Social History     Social History     Marital status:      Spouse name: N/A     Number  "of children: N/A     Years of education: N/A     Occupational History     Not on file.     Social History Main Topics     Smoking status: Never Smoker     Smokeless tobacco: Never Used      Comment: no smoking in the home     Alcohol use No      Comment: a beer a week     Drug use: No     Sexual activity: Yes     Partners: Male     Other Topics Concern     Parent/Sibling W/ Cabg, Mi Or Angioplasty Before 65f 55m? 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     Hobby Hazards No     Sleep Concern No     Stress Concern No     Weight Concern No     Special Diet No     Back Care No     Exercise Yes     Advised exercise 30 minutes/day at least 5 days/week.     Seat Belt Yes     Social History Narrative    Lives in [place].  No smokers in the home.  Has an indoor cat.  Advised about toxoplasmosis precautions.  No concerns about domestic violence.        FAMILY HISTORY:   Family History   Problem Relation Age of Onset     DIABETES Paternal Grandfather      JESSICA. Paternal Grandfather      Family History Negative Mother      Depression Mother      Thyroid Disease Mother      Family History Negative Father      Depression Maternal Uncle         EXAM:Vitals: Temp 97.6  F (36.4  C) (Temporal)  Ht 5' 3.75\" (1.619 m)  Wt 212 lb (96.2 kg)  LMP 09/03/2016 (Exact Date)  BMI 36.68 kg/m2  BMI= Body mass index is 36.68 kg/(m^2).    General appearance: Patient is alert and fully cooperative with history & exam.  No sign of distress is noted during the visit.     Psychiatric: Affect is pleasant & appropriate.  Patient appears motivated to improve health.     Respiratory: Breathing is regular & unlabored while sitting.     HEENT: Hearing is intact to spoken word.  Speech is clear.  No gross evidence of visual impairment that would impact ambulation.     Vascular: DP & PT " pulses are intact & regular, CFT immediate, positive digital hair growth bilaterally.  No significant edema or varicosities noted and skin temperature is normal to both lower extremities.     Neurologic: Lower extremity sensation is intact to light touch.  No evidence of weakness or contracture in the lower extremities.  No evidence of neuropathy.    Dermatologic: Adequate texture, turgor and tone about the integument.  No discoloration or thickening of the toenail however the right lateral and left lateral hallux nail border(s) are ingrown with localized erythema, discomfort and purulent drainage.     Musculoskeletal: Patient is ambulatory without assistive device or brace.  No gross ankle deformity noted.  No foot or ankle joint effusion is noted.     ASSESSMENT:       ICD-10-CM    1. Ingrowing nail L60.0 REMOVAL NAIL/NAIL BED, PARTIAL OR COMPLETE       Plan:   11/14/2017  We reviewed medical history and EPIC chart.  After obtaining informed consent to permanently remove the right lateral and left lateral hallux nail(s), I utilized 3 cc of lidocaine plain to achieve local anesthesia per digit.  The toenails were then prepped with Betadine in usual fashion.  A quarter inch Penrose drain was then utilized for hemostasis.  100% of the toenail border was avulsed utilizing a nail elevator, english anvil, 6100 blade and hemostat.  The proximal root portion of the nail was confirmed to be removed atraumatically.  Three applications of 89% phenol were applied to the surgical site for 30 seconds each followed by a curette after each application.  Surgical site was then flushed with alcohol and dressed with bacitracin and a nonadherent compression dressing.  Tourniquet was removed after approximately 3 minutes followed by immediate hyperemia to the distal aspect of the hallux.  Written postoperative instructions were dispensed and patient instructed to follow up in 1-2 weeks with any questions, pain,drainage, delayed  healing or concerns.  I answered all questions to patients satisfaction.     If patient calls in the next 1-3 weeks with continued redness, pain or drainage I would recommend beginning oral Keflex 500 mg, 4 times a day ×10 days, and discussed she is breast feeding so this will pass to baby.      Marcelo Ortiz DPM

## 2017-11-14 NOTE — NURSING NOTE
"Chief Complaint   Patient presents with     Thyroid Problem       Initial /78 (BP Location: Right arm, Patient Position: Chair, Cuff Size: Adult Regular)  Pulse 76  Temp 97.8  F (36.6  C) (Oral)  Resp 16  Ht 5' 3.75\" (1.619 m)  Wt 212 lb (96.2 kg)  LMP 09/03/2016 (Exact Date)  SpO2 98%  BMI 36.68 kg/m2 Estimated body mass index is 36.68 kg/(m^2) as calculated from the following:    Height as of this encounter: 5' 3.75\" (1.619 m).    Weight as of this encounter: 212 lb (96.2 kg).  Medication Reconciliation: complete   Faith Hutton CMA (AAMA)      "

## 2017-11-14 NOTE — NURSING NOTE
"Chief Complaint   Patient presents with     RECHECK     B/L plantar foot pain, Ingrown lateral B/L great toenails; LOV 9/12/2017     Procedure     Matrixectomy lateral Left and Right great toenail        Initial Temp 97.6  F (36.4  C) (Temporal)  Ht 5' 3.75\" (1.619 m)  Wt 212 lb (96.2 kg)  LMP 09/03/2016 (Exact Date)  BMI 36.68 kg/m2 Estimated body mass index is 36.68 kg/(m^2) as calculated from the following:    Height as of this encounter: 5' 3.75\" (1.619 m).    Weight as of this encounter: 212 lb (96.2 kg).  BP completed using cuff size: NA (Not Taken)  Medication Reconciliation: complete    Venus Holland CMA, November 14, 2017    "

## 2017-11-14 NOTE — Clinical Note
11/14/2017         RE: Teresita Hutton  [address] [facility] [address]        Dear Colleague,    Thank you for referring your patient, Teresita Hutton, to the [facility]. Please see a copy of my visit note below.    Chief Complaint   Patient presents with     RECHECK     B/L plantar foot pain, Ingrown lateral B/L great toenails; LOV 9/12/2017     Procedure     Matrixectomy lateral Left and Right great toenail        Weight management plan: Patient was referred to their PCP to discuss a diet and exercise plan.     HPI:  Teresita Hutton is a 32 year old female who is seen in consultation at the request of self.    Pt presents for eval of:   (Onset, Location, L/R, Character, Treatments, Injury if yes)    PATIENT HISTORY:  Teresita Hutton is a 32 year old female who presents to clinic complaining of chronically ingrown nail and aching pain and redness along the borders.  Present off and on for months.  Patient has attempted soaking and oral antibiotics in the past.  This has been ingrown or treated multiple times. They are requesting permanent treatment.    Review of Systems:  Patient denies fever, chills, rash, wound, stiffness, limping, numbness, weakness, heart burn, blood in stool, chest pain with activity, calf pain when walking, shortness of breath with activity, chronic cough, easy bleeding/bruising, swelling of ankles, excessive thirst, fatigue, depression, anxiety.  Pt admits to the symptoms noted in history above.     PAST MEDICAL HISTORY:   Past Medical History:   Diagnosis Date     NO ACTIVE PROBLEMS         PAST SURGICAL HISTORY:   Past Surgical History:   Procedure Laterality Date     wisdom teeth          MEDICATIONS:   Current Outpatient Prescriptions:      levothyroxine (SYNTHROID/LEVOTHROID) 75 MCG tablet, Take 1 tablet (75 mcg) by mouth daily, Disp: 30 tablet, Rfl: 1     order for DME, Breast pump, Disp: 1 each, Rfl: 0     Prenatal MV-Min-Fe Fum-FA-DHA  "(PRENATAL 1 PO), Take 1 tablet by mouth daily, Disp: , Rfl:      ALLERGIES:    Allergies   Allergen Reactions     Sulfa Drugs      Burning        SOCIAL HISTORY:   Social History     Social History     Marital status:      Spouse name: N/A     Number of children: N/A     Years of education: N/A     Occupational History     Not on file.     Social History Main Topics     Smoking status: Never Smoker     Smokeless tobacco: Never Used      Comment: no smoking in the home     Alcohol use No      Comment: a beer a week     Drug use: No     Sexual activity: Yes     Partners: Male     Other Topics Concern     Parent/Sibling W/ Cabg, Mi Or Angioplasty Before 65f 55m? 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     Hobby Hazards No     Sleep Concern No     Stress Concern No     Weight Concern No     Special Diet No     Back Care No     Exercise Yes     Advised exercise 30 minutes/day at least 5 days/week.     Seat Belt Yes     Social History Narrative    Lives in [place].  No smokers in the home.  Has an indoor cat.  Advised about toxoplasmosis precautions.  No concerns about domestic violence.        FAMILY HISTORY:   Family History   Problem Relation Age of Onset     DIABETES Paternal Grandfather      JESSICA. Paternal Grandfather      Family History Negative Mother      Depression Mother      Thyroid Disease Mother      Family History Negative Father      Depression Maternal Uncle         EXAM:Vitals: Temp 97.6  F (36.4  C) (Temporal)  Ht 5' 3.75\" (1.619 m)  Wt 212 lb (96.2 kg)  LMP 09/03/2016 (Exact Date)  BMI 36.68 kg/m2  BMI= Body mass index is 36.68 kg/(m^2).    General appearance: Patient is alert and fully cooperative with history & exam.  No sign of distress is noted during the visit.     Psychiatric: Affect is pleasant & appropriate.  " Patient appears motivated to improve health.     Respiratory: Breathing is regular & unlabored while sitting.     HEENT: Hearing is intact to spoken word.  Speech is clear.  No gross evidence of visual impairment that would impact ambulation.     Vascular: DP & PT pulses are intact & regular, CFT immediate, positive digital hair growth bilaterally.  No significant edema or varicosities noted and skin temperature is normal to both lower extremities.     Neurologic: Lower extremity sensation is intact to light touch.  No evidence of weakness or contracture in the lower extremities.  No evidence of neuropathy.    Dermatologic: Adequate texture, turgor and tone about the integument.  No discoloration or thickening of the toenail however the right lateral and left lateral hallux nail border(s) are ingrown with localized erythema, discomfort and purulent drainage.     Musculoskeletal: Patient is ambulatory without assistive device or brace.  No gross ankle deformity noted.  No foot or ankle joint effusion is noted.     ASSESSMENT:       ICD-10-CM    1. Ingrowing nail L60.0 REMOVAL NAIL/NAIL BED, PARTIAL OR COMPLETE       Plan:   11/14/2017  We reviewed medical history and EPIC chart.  After obtaining informed consent to permanently remove the right lateral and left lateral hallux nail(s), I utilized 3 cc of lidocaine plain to achieve local anesthesia per digit.  The toenails were then prepped with Betadine in usual fashion.  A quarter inch Penrose drain was then utilized for hemostasis.  100% of the toenail border was avulsed utilizing a nail elevator, english anvil, 6100 blade and hemostat.  The proximal root portion of the nail was confirmed to be removed atraumatically.  Three applications of 89% phenol were applied to the surgical site for 30 seconds each followed by a curette after each application.  Surgical site was then flushed with alcohol and dressed with bacitracin and a nonadherent compression dressing.   Tourniquet was removed after approximately 3 minutes followed by immediate hyperemia to the distal aspect of the hallux.  Written postoperative instructions were dispensed and patient instructed to follow up in 1-2 weeks with any questions, pain,drainage, delayed healing or concerns.  I answered all questions to patients satisfaction.     If patient calls in the next 1-3 weeks with continued redness, pain or drainage I would recommend beginning oral Keflex 500 mg, 4 times a day ×10 days, and discussed she is breast feeding so this will pass to baby.      Marcelo Ortiz DPM      Again, thank you for allowing me to participate in the care of your patient.        Sincerely,        Marcelo Ortiz DPM

## 2017-11-14 NOTE — PATIENT INSTRUCTIONS
INGROWN TOENAIL POSTOPERATIVE INSTRUCTIONS   (Nail avulsion or chemical matrixectomy)   1.  Go directly home and elevate the affected foot on one or two pillows for the remainder of the day & evening. Your toe may stay numb for 2-8 hours.   2.  Take Tylenol, ibuprofen or another anti-inflammatory as needed for pain. Most people require no pain medication.  3.  Use oral antibiotic if that was prescribed at your doctor visit. Take the entire prescription even if your symptoms have improved.   4.  Keep dressing dry and intact the day of the procedure. The morning after the procedure, remove entire dressing and soak or wash the affected area in lukewarm water for 5-10 minutes.  You may add Epsom salt to soothe the area and help it become drier. Do this twice a day or more until the surgical site remains dry without drainage.  This may take 1-2 weeks if a small part of your nail was removed or 4-8 weeks if the entire nail was removed. You may count showering or bathing as one soak.  After each soak, pat the area dry with a clean towel or gauze and then allow to air dry for a few minutes. Then cover with a cloth or fabric bandaid.  Avoid ointments as they keep the area to wet. Encourage this wound to become dry with a scab.   5.  You may walk and pursue everyday activities as tolerated with either an open toe shoe or cut-out shoe as needed. You may wear regular roomy shoes if no pain is noted.  No swimming in the lake, river, pool or hot tub until the wound has become dry.   7.  Watch for any signs or symptoms of infection such as: red streaks going up the foot/leg, swelling, pus or foul odor. For patients that have had a permanent phenol procedure, the toe will drain longer and may look red or sore for a half an inch around the wound similar to infection because it is a chemical burn.  Please call with questions.  8.  You may call my office in 1-2 weeks if the surgical site is not becoming drier or if other complications  occur.   9.  There is 5-10% chance of complications such as infection or formation of another nail or a thick scared nail.

## 2017-11-14 NOTE — MR AVS SNAPSHOT
After Visit Summary   11/14/2017    Teresita Hutton    MRN: [id number]           Patient Information     Date Of Birth          1985        Visit Information        Provider Department      11/14/2017 11:20 AM Stephanie Campos MD [facility]        Today's Diagnoses     Need for prophylactic vaccination and inoculation against influenza    -  1    Hypothyroidism, unspecified type           Follow-ups after your visit        Follow-up notes from your care team     Return in about 4 weeks (around 12/12/2017).      Your next 10 appointments already scheduled     Nov 14, 2017  1:45 PM CST   Return Visit with Marcelo Ortiz DPM   [facility] ([facility]    [address] [address]   [phone]            Nov 29, 2017  7:00 AM CST   Return Visit with Samira Patterson   [facility] ([facility])    [address]  [facility] [address]   [phone]            Dec 13, 2017  7:45 AM CST   LAB with NL LAB [facility] ([facility])    [address] [address]   [phone]           Please do not eat 10-12 hours before your appointment if you are coming in fasting for labs on lipids, cholesterol, or glucose (sugar). This does not apply to pregnant women. Water, hot tea and black coffee (with nothing added) are okay. Do not drink other fluids, diet soda or chew gum.            Dec 14, 2017  4:00 PM CST   Office Visit with Stephanie Campos MD   [facility] ([facility]    [address]  [place] [address]   [phone]           Bring a current list of meds and any records pertaining to this visit. For Physicals, please bring immunization records and any forms needing to be filled out. Please arrive 10 minutes early to complete paperwork.              Future tests that were ordered for you today   "   Open Future Orders        Priority Expected Expires Ordered    TSH with free T4 reflex Routine 12/13/2017 11/14/2018 11/14/2017            Who to contact     If you have questions or need follow up information about today's clinic visit or your schedule please contact [facility] directly at [phone].  Normal or non-critical lab and imaging results will be communicated to you by MyChart, letter or phone within 4 business days after the clinic has received the results. If you do not hear from us within 7 days, please contact the clinic through PureCarshart or phone. If you have a critical or abnormal lab result, we will notify you by phone as soon as possible.  Submit refill requests through isocket or call your pharmacy and they will forward the refill request to us. Please allow 3 business days for your refill to be completed.          Additional Information About Your Visit        PureCarshart Information     isocket gives you secure access to your electronic health record. If you see a primary care provider, you can also send messages to your care team and make appointments. If you have questions, please call your primary care clinic.  If you do not have a primary care provider, please call [phone] and they will assist you.        Care EveryWhere ID     This is your Care EveryWhere ID. This could be used by other organizations to access your [facility] medical records  [id number]        Your Vitals Were     Pulse Temperature Respirations Height Last Period Pulse Oximetry    76 97.8  F (36.6  C) (Oral) 16 5' 3.75\" (1.619 m) 09/03/2016 (Exact Date) 98%    BMI (Body Mass Index)                   36.68 kg/m2            Blood Pressure from Last 3 Encounters:   11/14/17 122/78   11/06/17 114/80   09/08/17 110/70    Weight from Last 3 Encounters:   11/14/17 212 lb (96.2 kg)   11/06/17 212 lb (96.2 kg)   09/12/17 202 lb (91.6 kg)              We Performed the Following     Anti thyroglobulin antibody     " Thyroid peroxidase antibody     Thyroid stimulating immunoglobulin        Primary Care Provider Office Phone # Fax #    Nikki Castro [phone]       [address] [place] [address]        Equal Access to Services     [facility]: Hadii aad ku hadjoslyno Sovivianaali, waaxda luqadaha, qaybta kaalmada mike, taty gerardoin hayaasoumya greentimmyidalia grey. So [facility] [phone].    ATENCIÓN: Si habla español, tiene a sterling disposición servicios gratuitos de asistencia lingüística. Llame al [phone].    We comply with applicable federal civil rights laws and [place] laws. We do not discriminate on the basis of race, color, national origin, age, disability, sex, sexual orientation, or gender identity.            Thank you!     Thank you for choosing [facility]  for your care. Our goal is always to provide you with excellent care. Hearing back from our patients is one way we can continue to improve our services. Please take a few minutes to complete the written survey that you may receive in the mail after your visit with us. Thank you!             Your Updated Medication List - Protect others around you: Learn how to safely use, store and throw away your medicines at www.disposemymeds.org.          This list is accurate as of: 11/14/17 11:55 AM.  Always use your most recent med list.                   Brand Name Dispense Instructions for use Diagnosis    levothyroxine 75 MCG tablet    SYNTHROID/LEVOTHROID    30 tablet    Take 1 tablet (75 mcg) by mouth daily    Hypothyroidism, unspecified type       order for DME     1 each    Breast pump    Encounter for supervision of other normal pregnancy in second trimester, Rh negative status in sears pregnancy in second trimester       PRENATAL 1 PO      Take 1 tablet by mouth daily

## 2017-11-14 NOTE — PROGRESS NOTES
SUBJECTIVE:                                                    Teresita Hutton is a 32 year old female who presents to clinic today for the following health issues:      HPI    Hypothyroidism Follow-up      Since last visit, patient describes the following symptoms: Weight stable, no hair loss, no skin changes, no constipation, no loose stools, weight gain of 10 lbs, dry skin, constipation, loose stools, anxiety, depression and fatigue        Problem list and histories reviewed & adjusted, as indicated.  Additional history: as documented        Patient Active Problem List   Diagnosis     Rh negative status in sears pregnancy in third trimester     Encounter for supervision of other normal pregnancy in third trimester     Adjustment disorder with anxiety     Past Surgical History:   Procedure Laterality Date     wisdom teeth         Social History   Substance Use Topics     Smoking status: Never Smoker     Smokeless tobacco: Never Used      Comment: no smoking in the home     Alcohol use No      Comment: a beer a week     Family History   Problem Relation Age of Onset     DIABETES Paternal Grandfather      C.A.D. Paternal Grandfather      Family History Negative Mother      Depression Mother      Thyroid Disease Mother      Family History Negative Father      Depression Maternal Uncle          Current Outpatient Prescriptions   Medication Sig Dispense Refill     levothyroxine (SYNTHROID/LEVOTHROID) 75 MCG tablet Take 1 tablet (75 mcg) by mouth daily 30 tablet 1     order for DME Breast pump 1 each 0     Prenatal MV-Min-Fe Fum-FA-DHA (PRENATAL 1 PO) Take 1 tablet by mouth daily       Allergies   Allergen Reactions     Sulfa Drugs      Burning     Recent Labs   Lab Test  11/09/17   1336  11/06/17   1458  03/24/11   1027  08/09/10   1311   LDL   --    --    --   67   HDL   --    --    --   77   TRIG   --    --    --   77   ALT   --   19   --    --    CR   --   0.88   --    --    GFRESTIMATED   --   75   --     "--    GFRESTBLACK   --   >90   --    --    POTASSIUM   --   3.9   --    --    TSH  36.55*   --   1.86   --       BP Readings from Last 3 Encounters:   11/14/17 122/78   11/06/17 114/80   09/08/17 110/70    Wt Readings from Last 3 Encounters:   11/14/17 212 lb (96.2 kg)   11/14/17 212 lb (96.2 kg)   11/06/17 212 lb (96.2 kg)                  Labs reviewed in EPIC          ROS:  Constitutional, HEENT, cardiovascular, pulmonary, gi and gu systems are negative, except as otherwise noted.      OBJECTIVE:   /78 (BP Location: Right arm, Patient Position: Chair, Cuff Size: Adult Regular)  Pulse 76  Temp 97.8  F (36.6  C) (Oral)  Resp 16  Ht 5' 3.75\" (1.619 m)  Wt 212 lb (96.2 kg)  LMP 09/03/2016 (Exact Date)  SpO2 98%  BMI 36.68 kg/m2  Body mass index is 36.68 kg/(m^2).   Physical Exam   Constitutional: She appears well-developed and well-nourished.   HENT:   Head: Normocephalic and atraumatic.   Neck: Thyromegaly present.   Cardiovascular: Normal rate, regular rhythm and normal heart sounds.    Pulmonary/Chest: Effort normal and breath sounds normal.   Psychiatric: She has a normal mood and affect.         Diagnostic Test Results:    ASSESSMENT/PLAN:     Problem List Items Addressed This Visit     None      Visit Diagnoses     Hypothyroidism, unspecified type    -  Primary    Relevant Orders    Anti thyroglobulin antibody (Completed)    Thyroid peroxidase antibody (Completed)    Thyroid stimulating immunoglobulin (Completed)    TSH with free T4 reflex    Need for prophylactic vaccination and inoculation against influenza             Signs of hashimoto's thyroiditis  Start levothyroxine   Labs as ordered  Discussed home care  Reportable signs and symptoms discussed  RTC if symptoms persist or fail to improve    Stephanie Campos MD  [facility]  "

## 2017-11-15 LAB
THYROGLOB AB SERPL IA-ACNC: <20 IU/ML (ref 0–40)
THYROPEROXIDASE AB SERPL-ACNC: >5000 IU/ML

## 2017-11-17 ENCOUNTER — MYC MEDICAL ADVICE (OUTPATIENT)
Dept: FAMILY MEDICINE | Facility: OTHER | Age: 32
End: 2017-11-17

## 2017-11-21 ENCOUNTER — MYC MEDICAL ADVICE (OUTPATIENT)
Dept: FAMILY MEDICINE | Facility: OTHER | Age: 32
End: 2017-11-21

## 2017-11-21 LAB — TSI SER-ACNC: 1 TSI INDEX

## 2017-11-28 ENCOUNTER — TRANSFERRED RECORDS (OUTPATIENT)
Dept: HEALTH INFORMATION MANAGEMENT | Facility: CLINIC | Age: 32
End: 2017-11-28

## 2017-12-29 ENCOUNTER — TELEPHONE (OUTPATIENT)
Dept: FAMILY MEDICINE | Facility: OTHER | Age: 32
End: 2017-12-29

## 2017-12-29 DIAGNOSIS — E06.3 HASHIMOTO'S THYROIDITIS: Primary | ICD-10-CM

## 2017-12-29 LAB
T3 SERPL-MCNC: 126 NG/DL (ref 60–181)
T4 SERPL-MCNC: 10 UG/DL (ref 4.5–13.9)
TSH SERPL DL<=0.005 MIU/L-ACNC: 1.4 MU/L (ref 0.4–4)

## 2017-12-29 PROCEDURE — 99000 SPECIMEN HANDLING OFFICE-LAB: CPT | Performed by: INTERNAL MEDICINE

## 2017-12-29 PROCEDURE — 84443 ASSAY THYROID STIM HORMONE: CPT | Performed by: INTERNAL MEDICINE

## 2017-12-29 PROCEDURE — 36415 COLL VENOUS BLD VENIPUNCTURE: CPT | Performed by: INTERNAL MEDICINE

## 2017-12-29 PROCEDURE — 84480 ASSAY TRIIODOTHYRONINE (T3): CPT | Performed by: INTERNAL MEDICINE

## 2017-12-29 PROCEDURE — 84436 ASSAY OF TOTAL THYROXINE: CPT | Performed by: INTERNAL MEDICINE

## 2017-12-29 PROCEDURE — 84479 ASSAY OF THYROID (T3 OR T4): CPT | Mod: 90 | Performed by: INTERNAL MEDICINE

## 2017-12-29 NOTE — LETTER
[facility]  [address]  [facility] [place] [address]  [phone]        January 3, 2018    Teresita Hutton  [address] [address]              Dear Teresita Hutton    We have been trying to reach you regarding your message from 12/29/17.  She said she didn't request for you to see a chiropractor and isn't sure why the other testing is needed.  She would like for you to schedule an office visit to discuss symptoms as she cannot approve them without specific symptoms.      You may call our office at [phone] to schedule an appointment.    Please disregard this notice if you have already had your labs drawn or made an appointment.        Sincerely,        Stephanie Campos MD

## 2017-12-29 NOTE — TELEPHONE ENCOUNTER
I did not request her to see a chiropracter. I do not know why these tests are needed either. Please advise visit to discuss if there are any symptoms. I cannot approve them with out any specific symptoms

## 2017-12-29 NOTE — TELEPHONE ENCOUNTER
Pt is requested if  who recommended her to go see a chirpractor to place some orders for lab work that a chiropractor is wanting her to get done. They had told her to ask her PCP to add the orders and plus the office is closed on Fridays. Orders of the lab work that need to get done are in 's mailbox. Please advise. She would like to come back today and get them done.

## 2017-12-29 NOTE — TELEPHONE ENCOUNTER
Paper placed in RK bin to review/advise. Form has multiple labs listed on there, including: CBC w/ platelet/differential, Chem Advantage (CMP, LDL, Albumin, etc.) Thyroid Advantage (TSH, Free T3, Total T4, rT3, Free T4, etc.) Vitamin D-Flam (Vitamin D, Homocysteine, CRP-hs) Additional Biomarkers (Ferritin, Magnesium, A1C, Fibrinogen Activity, Iron Saturation) and a UA. Unsure why Chiropractor would need all of these.    Please review/advise.  Nay Cardenas, CMA

## 2017-12-30 LAB — T3RU NFR SERPL: 38 % (ref 28–41)

## 2018-01-02 ENCOUNTER — TRANSFERRED RECORDS (OUTPATIENT)
Dept: HEALTH INFORMATION MANAGEMENT | Facility: CLINIC | Age: 33
End: 2018-01-02

## 2018-03-13 ENCOUNTER — TRANSFERRED RECORDS (OUTPATIENT)
Dept: HEALTH INFORMATION MANAGEMENT | Facility: CLINIC | Age: 33
End: 2018-03-13

## 2018-09-26 ENCOUNTER — ALLIED HEALTH/NURSE VISIT (OUTPATIENT)
Dept: FAMILY MEDICINE | Facility: CLINIC | Age: 33
End: 2018-09-26
Payer: COMMERCIAL

## 2018-09-26 VITALS — WEIGHT: 167.5 LBS | BODY MASS INDEX: 28.98 KG/M2

## 2018-09-26 DIAGNOSIS — Z32.00 POSSIBLE PREGNANCY, NOT CONFIRMED: Primary | ICD-10-CM

## 2018-09-26 LAB
B-HCG SERPL-ACNC: 4686 IU/L (ref 0–5)
BETA HCG QUAL IFA URINE: POSITIVE

## 2018-09-26 PROCEDURE — 84703 CHORIONIC GONADOTROPIN ASSAY: CPT | Performed by: FAMILY MEDICINE

## 2018-09-26 PROCEDURE — 84702 CHORIONIC GONADOTROPIN TEST: CPT | Performed by: OBSTETRICS & GYNECOLOGY

## 2018-09-26 PROCEDURE — 99207 ZZC NO CHARGE NURSE ONLY: CPT

## 2018-09-26 PROCEDURE — 36415 COLL VENOUS BLD VENIPUNCTURE: CPT | Performed by: OBSTETRICS & GYNECOLOGY

## 2018-09-26 NOTE — MR AVS SNAPSHOT
After Visit Summary   9/26/2018    Teresita Hutton    MRN: [id number]           Patient Information     Date Of Birth          1985        Visit Information        Provider Department      9/26/2018 11:00 AM RG RN [facility]        Today's Diagnoses     Possible pregnancy, not confirmed    -  1       Follow-ups after your visit        Your next 10 appointments already scheduled     Nov 06, 2018  8:15 AM CST   New Prenatal with Hamida Cabezas,    [facility] ([facility])    [id number] [place] [address] [phone]              Future tests that were ordered for you today     Open Future Orders        Priority Expected Expires Ordered    US OB < 14 Weeks Single Routine 9/27/2018 12/26/2018 9/27/2018            Who to contact     If you have questions or need follow up information about today's clinic visit or your schedule please contact [facility] directly at [phone].  Normal or non-critical lab and imaging results will be communicated to you by [facility] Labshart, letter or phone within 4 business days after the clinic has received the results. If you do not hear from us within 7 days, please contact the clinic through [email or web address] or phone. If you have a critical or abnormal lab result, we will notify you by phone as soon as possible.  Submit refill requests through IORevolution or call your pharmacy and they will forward the refill request to us. Please allow 3 business days for your refill to be completed.          Additional Information About Your Visit        MyChart Information     IORevolution gives you secure access to your electronic health record. If you see a primary care provider, you can also send messages to your care team and make appointments. If you have questions, please call your primary care clinic.  If you do not have a primary care provider, please call [phone] and they will assist you.        Care  EveryWhere ID     This is your Care EveryWhere ID. This could be used by other organizations to access your [facility] medical records  [id number]        Your Vitals Were     Last Period BMI (Body Mass Index)                08/20/2018 28.98 kg/m2           Blood Pressure from Last 3 Encounters:   11/14/17 122/78   11/06/17 114/80   09/08/17 110/70    Weight from Last 3 Encounters:   09/26/18 167 lb 8 oz (76 kg)   11/14/17 212 lb (96.2 kg)   11/14/17 212 lb (96.2 kg)              We Performed the Following     Beta HCG qual IFA urine     HCG quantitative pregnancy        Primary Care Provider Office Phone # Fax #    Nikki Aileen Castro [phone]       [address] [place] [address]        Equal Access to Services     [facility]: Hadii aad ku hadasho Sorodney, waaxda luqadaha, qaybta kaalmada adejayden, taty wong . So [facility] [phone].    ATENCIÓN: Si habla español, tiene a sterling disposición servicios gratuitos de asistencia lingüística. Drew al [phone].    We comply with applicable federal civil rights laws and [place] laws. We do not discriminate on the basis of race, color, national origin, age, disability, sex, sexual orientation, or gender identity.            Thank you!     Thank you for choosing [facility]  for your care. Our goal is always to provide you with excellent care. Hearing back from our patients is one way we can continue to improve our services. Please take a few minutes to complete the written survey that you may receive in the mail after your visit with us. Thank you!             Your Updated Medication List - Protect others around you: Learn how to safely use, store and throw away your medicines at www.disposemymeds.org.          This list is accurate as of 9/26/18 11:59 PM.  Always use your most recent med list.                   Brand Name Dispense Instructions for use Diagnosis    levothyroxine 75 MCG tablet     SYNTHROID/LEVOTHROID    30 tablet    Take 1 tablet (75 mcg) by mouth daily    Hypothyroidism, unspecified type       order for DME     1 each    Breast pump    Encounter for supervision of other normal pregnancy in second trimester, Rh negative status in sears pregnancy in second trimester       PRENATAL 1 PO      Take 1 tablet by mouth daily

## 2018-09-26 NOTE — PROGRESS NOTES
Teresita Hutton is a 33 year old here today for a pregnancy test.  LMP: Patient's last menstrual period was 2018.  Wt: 167 lbs 8 oz.    Symptoms include breast tenderness.    Teresita informed of positive pregnancy test results. ALTON: 2019    Educational advice given: nutrition, smoking and drugs & alcohol.    Current medications reviewed: Yes    Previous pregnancy history remarkable for: Infection in umbilical cord.    Plan: take multivitamin or pre-nikia vitamins, OB Education packet given, requesting information re: termination of pregnancy and she is unsure of her plans for this pregnancy.    She is to call back if she has any questions or concerns.  She is advised to notify a provider immediately if she experiences any severe cramping or abdominal pain or any vaginal bleeding.    Yanira Kuo, RN, BSN

## 2018-09-27 DIAGNOSIS — Z32.01 PREGNANCY EXAMINATION OR TEST, POSITIVE RESULT: Primary | ICD-10-CM

## 2018-10-15 ENCOUNTER — RADIANT APPOINTMENT (OUTPATIENT)
Dept: ULTRASOUND IMAGING | Facility: CLINIC | Age: 33
End: 2018-10-15
Attending: OBSTETRICS & GYNECOLOGY
Payer: COMMERCIAL

## 2018-10-15 DIAGNOSIS — Z32.01 PREGNANCY EXAMINATION OR TEST, POSITIVE RESULT: ICD-10-CM

## 2018-10-15 PROCEDURE — 76801 OB US < 14 WKS SINGLE FETUS: CPT | Performed by: RADIOLOGY

## 2018-11-06 ENCOUNTER — PRENATAL OFFICE VISIT (OUTPATIENT)
Dept: OBGYN | Facility: CLINIC | Age: 33
End: 2018-11-06
Payer: COMMERCIAL

## 2018-11-06 VITALS
HEIGHT: 64 IN | HEART RATE: 77 BPM | OXYGEN SATURATION: 100 % | WEIGHT: 171.2 LBS | SYSTOLIC BLOOD PRESSURE: 104 MMHG | BODY MASS INDEX: 29.23 KG/M2 | DIASTOLIC BLOOD PRESSURE: 63 MMHG

## 2018-11-06 DIAGNOSIS — E06.3 HASHIMOTO'S THYROIDITIS: ICD-10-CM

## 2018-11-06 DIAGNOSIS — Z34.91 NORMAL PREGNANCY IN FIRST TRIMESTER: Primary | ICD-10-CM

## 2018-11-06 DIAGNOSIS — Z98.891 H/O: C-SECTION: ICD-10-CM

## 2018-11-06 LAB
ABO + RH BLD: NORMAL
ABO + RH BLD: NORMAL
ALBUMIN UR-MCNC: NEGATIVE MG/DL
AMORPH CRY #/AREA URNS HPF: ABNORMAL /HPF
APPEARANCE UR: ABNORMAL
BASOPHILS # BLD AUTO: 0 10E9/L (ref 0–0.2)
BASOPHILS NFR BLD AUTO: 0.2 %
BILIRUB UR QL STRIP: NEGATIVE
BLD GP AB SCN SERPL QL: NORMAL
BLOOD BANK CMNT PATIENT-IMP: NORMAL
COLOR UR AUTO: YELLOW
DIFFERENTIAL METHOD BLD: NORMAL
EOSINOPHIL # BLD AUTO: 0.2 10E9/L (ref 0–0.7)
EOSINOPHIL NFR BLD AUTO: 2.6 %
ERYTHROCYTE [DISTWIDTH] IN BLOOD BY AUTOMATED COUNT: 12.3 % (ref 10–15)
GLUCOSE UR STRIP-MCNC: NEGATIVE MG/DL
HCT VFR BLD AUTO: 37.2 % (ref 35–47)
HGB BLD-MCNC: 12.8 G/DL (ref 11.7–15.7)
HGB UR QL STRIP: NEGATIVE
IMM GRANULOCYTES # BLD: 0 10E9/L (ref 0–0.4)
IMM GRANULOCYTES NFR BLD: 0.3 %
KETONES UR STRIP-MCNC: NEGATIVE MG/DL
LEUKOCYTE ESTERASE UR QL STRIP: NEGATIVE
LYMPHOCYTES # BLD AUTO: 1.9 10E9/L (ref 0.8–5.3)
LYMPHOCYTES NFR BLD AUTO: 29.6 %
MCH RBC QN AUTO: 31.4 PG (ref 26.5–33)
MCHC RBC AUTO-ENTMCNC: 34.4 G/DL (ref 31.5–36.5)
MCV RBC AUTO: 91 FL (ref 78–100)
MONOCYTES # BLD AUTO: 0.3 10E9/L (ref 0–1.3)
MONOCYTES NFR BLD AUTO: 4.6 %
NEUTROPHILS # BLD AUTO: 3.9 10E9/L (ref 1.6–8.3)
NEUTROPHILS NFR BLD AUTO: 62.7 %
NITRATE UR QL: NEGATIVE
PH UR STRIP: 7.5 PH (ref 5–7)
PLATELET # BLD AUTO: 258 10E9/L (ref 150–450)
RBC # BLD AUTO: 4.07 10E12/L (ref 3.8–5.2)
RBC #/AREA URNS AUTO: ABNORMAL /HPF
SOURCE: ABNORMAL
SP GR UR STRIP: 1.02 (ref 1–1.03)
SPECIMEN EXP DATE BLD: NORMAL
TSH SERPL DL<=0.005 MIU/L-ACNC: 1.32 MU/L (ref 0.4–4)
UROBILINOGEN UR STRIP-MCNC: NORMAL MG/DL (ref 0–2)
WBC # BLD AUTO: 6.3 10E9/L (ref 4–11)
WBC #/AREA URNS AUTO: ABNORMAL /HPF

## 2018-11-06 PROCEDURE — 85025 COMPLETE CBC W/AUTO DIFF WBC: CPT | Performed by: OBSTETRICS & GYNECOLOGY

## 2018-11-06 PROCEDURE — 99207 ZZC FIRST OB VISIT: CPT | Performed by: OBSTETRICS & GYNECOLOGY

## 2018-11-06 PROCEDURE — 87389 HIV-1 AG W/HIV-1&-2 AB AG IA: CPT | Performed by: OBSTETRICS & GYNECOLOGY

## 2018-11-06 PROCEDURE — 86762 RUBELLA ANTIBODY: CPT | Performed by: OBSTETRICS & GYNECOLOGY

## 2018-11-06 PROCEDURE — 87491 CHLMYD TRACH DNA AMP PROBE: CPT | Performed by: OBSTETRICS & GYNECOLOGY

## 2018-11-06 PROCEDURE — 36415 COLL VENOUS BLD VENIPUNCTURE: CPT | Performed by: OBSTETRICS & GYNECOLOGY

## 2018-11-06 PROCEDURE — 86901 BLOOD TYPING SEROLOGIC RH(D): CPT | Performed by: OBSTETRICS & GYNECOLOGY

## 2018-11-06 PROCEDURE — 84443 ASSAY THYROID STIM HORMONE: CPT | Performed by: OBSTETRICS & GYNECOLOGY

## 2018-11-06 PROCEDURE — 86900 BLOOD TYPING SEROLOGIC ABO: CPT | Performed by: OBSTETRICS & GYNECOLOGY

## 2018-11-06 PROCEDURE — 81001 URINALYSIS AUTO W/SCOPE: CPT | Performed by: OBSTETRICS & GYNECOLOGY

## 2018-11-06 PROCEDURE — 86780 TREPONEMA PALLIDUM: CPT | Performed by: OBSTETRICS & GYNECOLOGY

## 2018-11-06 PROCEDURE — 87340 HEPATITIS B SURFACE AG IA: CPT | Performed by: OBSTETRICS & GYNECOLOGY

## 2018-11-06 PROCEDURE — 87591 N.GONORRHOEAE DNA AMP PROB: CPT | Performed by: OBSTETRICS & GYNECOLOGY

## 2018-11-06 PROCEDURE — 86850 RBC ANTIBODY SCREEN: CPT | Performed by: OBSTETRICS & GYNECOLOGY

## 2018-11-06 RX ORDER — ERGOCALCIFEROL 1.25 MG/1
CAPSULE, LIQUID FILLED ORAL
Refills: 0 | COMMUNITY
Start: 2018-04-03

## 2018-11-06 RX ORDER — LEVOTHYROXINE SODIUM 100 UG/1
TABLET ORAL
Refills: 2 | COMMUNITY
Start: 2018-10-09

## 2018-11-06 ASSESSMENT — PATIENT HEALTH QUESTIONNAIRE - PHQ9: SUM OF ALL RESPONSES TO PHQ QUESTIONS 1-9: 0

## 2018-11-06 NOTE — PATIENT INSTRUCTIONS
Prenatal Care  What is prenatal care?   Prenatal care is the care you receive when you are pregnant. It includes care given by your healthcare provider, support from your family, and an extra focus on giving yourself the care you need during this special time. Prenatal care improves your chances for a healthy pregnancy and healthy baby.   When should I see my healthcare provider?   Good care during pregnancy includes regularly scheduled prenatal exams.   If you are not yet pregnant but planning to get pregnant in the next few months, see your provider. Your provider may do some tests and talk about things you can do to have a healthy pregnancy and healthy baby.   You should schedule your first prenatal visit with your provider as soon as you think or know you are pregnant. Depending on your health and health history, your provider will probably schedule visits at least once a month for the first 6 months. During the 7th and 8th months you will see your provider every 2 weeks. During the last month you will probably see your provider once a week until you deliver your baby. If your pregnancy is high risk, your provider will probably want to see you more often. In some cases your provider may refer you to a medical specialist for more help with special needs, such as diabetes.   At each visit your healthcare provider will check to make sure that you and the baby are healthy. Regular visits can help you and your provider prevent possible problems. They can also help your provider find and treat any problems early. In addition to meeting your medical needs, your provider advise you about caring for yourself. You will talk about how to have a healthy diet and get plenty of exercise and rest. Your provider can also help you deal with the emotional changes that can happen during pregnancy.   What will happen at the first prenatal visit?   At your first visit, your provider will ask about your personal medical history. He  or she will also ask about the baby's father and your family health history. This information can help give your provider an idea of any problems you might have during your pregnancy. You will have a physical exam, including checks of your height, weight, and blood pressure and a pelvic exam. You will have a Pap test, urine tests, blood tests, and cultures of the cervix and vagina to check for infection.   Your provider will calculate your due date and the age of your baby. If your periods were regular before you got pregnant, and you are sure of the day when your last period started, your due date will be estimated to be 40 weeks from that day.   Your provider will talk to you about how to stay healthy during your pregnancy.   What will happen at other prenatal visits?   Your provider will check how you are doing and how the baby is developing. He or she will discuss how you are feeling, ask if you have any problems, and answer your questions.   During each prenatal visit your provider will:   weigh you   take your blood pressure   check your urine for sugar, protein, or bacteria   check your face, hands, ankles, and feet for swelling   listen to the baby's heartbeat   measure the size of the uterus to check the baby's growth.   At different times during the pregnancy, other exams and tests may be done. Some are routine and others are done only when a problem is suspected or you have a risk factor for a problem. Examples of other tests you might have are:   tests to check for genetic problems and some birth defects, such as:   chorionic villus sampling of cells from the placenta   amniocentesis to test the fluid around the baby   blood tests called triple or quad screens   ultrasound scans to check the baby's growth, development, and health and to look at your uterus, the amniotic sac, and the placenta   blood tests to check for diabetes   electronic monitoring to check the health of the baby.   How can I take care  of myself during my pregnancy?   Here are some things you can do to take good care of yourself during your pregnancy and prepare for the birth of your child:   Keep all appointments with your healthcare provider. Use these visits to discuss your pregnancy concerns or problems. Write down questions before each visit so that you will not forget about things you want to talk about.   Eat healthy meals that include whole grains, fresh fruits and vegetables, and calcium-rich foods, such as milk, cheese, and yogurt. Choose foods low in saturated fat. Do not eat uncooked or undercooked meats or fish.   Avoid certain fish with high levels of mercury. These fish include shark, julia mackerel, swordfish, and tilefish. Do not eat more than 12 ounces of fish per week, or no more than 6 ounces of tuna fish per week. Because albacore tuna fish is also high in mercury, choose light tuna.   Drink plenty of water each day.   Take vitamins, other supplements, and medicines as recommended by your provider.   Unless your healthcare provider tells you not to, try to be physically active for at least 30 minutes a day, most days of the week. If you are pressed for time, you might find it easier to exercise 10 minutes at a time, 3 times a day. Consider taking a prenatal exercise class.   Do not smoke, do not drink alcohol, and do not take illegal drugs.   Talk to your healthcare provider before you take any medicine, including nonprescription and herbal medicines. Some medicines are not safe during pregnancy.   Avoid hot tubs or saunas.   If you have cats in your home, do not empty the cat litter while you are pregnant. It may contain a parasite that causes an infection called toxoplasmosis, which can cause birth defects. Also, use gloves when you work in garden areas used by cats.   Stay away from toxic chemicals like insecticides, solvents (such as some  or paint thinners), lead, and mercury. Check labels on household products.  Most dangerous products have pregnancy warnings on their labels. Ask your healthcare provider about products if you are unsure.   Relax by taking breaks from work or chores.   Help reduce stress by sharing your feelings with others.   Report any violence or other types of abuse in your home.   Learn more about pregnancy, labor, and delivery. Read books, watch videos, go to a childbirth class, and talk with experienced moms.   Plan for the lifestyle changes a new baby will bring. Prepare for possible changes in your budget, work situation, daily schedule, and relationships with family and friends.   Talk to your provider about the pros and cons of breast-feeding.   Before and during your pregnancy, try to do everything you can to keep yourself and your baby healthy during your pregnancy.     Published by [employer].  This content is reviewed periodically and is subject to change as new health information becomes available. The information is intended to inform and educate and is not a replacement for medical evaluation, advice, diagnosis or treatment by a healthcare professional.   Developed by [employer].   ? 2010 [employer] and/or its affiliates. All Rights Reserved.   Copyright   [employer] 2011

## 2018-11-06 NOTE — PROGRESS NOTES
HPI:    Teresita is a 33 year old yo  at 11 1/7 weeks by LMP 18 and early ultrasound who presents today for her initial OB visit.  Last pap smear was in 2016 and it was normal.     Issues: Nausea    Past OB Hx:  C/S     Father of baby: No health issues       Past Medical History:   Diagnosis Date     NO ACTIVE PROBLEMS              Past Surgical History:   Procedure Laterality Date     wisdom teeth          Family History   Problem Relation Age of Onset     Diabetes Paternal Grandfather      C.A.D. Paternal Grandfather      Family History Negative Mother      Depression Mother      Thyroid Disease Mother      Family History Negative Father      Depression Maternal Uncle         Social History     Social History     Marital status:      Spouse name: N/A     Number of children: N/A     Years of education: N/A     Occupational History     Not on file.     Social History Main Topics     Smoking status: Never Smoker     Smokeless tobacco: Never Used      Comment: no smoking in the home     Alcohol use No      Comment: not when pg     Drug use: No     Sexual activity: Yes     Partners: Male     Other Topics Concern     Parent/Sibling W/ Cabg, Mi Or Angioplasty Before 65f 55m? 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     Hobby Hazards No     Sleep Concern No     Stress Concern No     Weight Concern No     Special Diet No     Back Care No     Exercise Yes     Advised exercise 30 minutes/day at least 5 days/week.     Seat Belt Yes     Social History Narrative    Lives in [place].  No smokers in the home.  Has an indoor cat.  Advised about toxoplasmosis precautions.  No concerns about domestic violence.         Current Outpatient Prescriptions:      levothyroxine (SYNTHROID/LEVOTHROID) 75 MCG tablet, Take 1 tablet (75 mcg) by mouth daily,  Disp: 30 tablet, Rfl: 1     Prenatal MV-Min-Fe Fum-FA-DHA (PRENATAL 1 PO), Take 1 tablet by mouth daily, Disp: , Rfl:      order for DME, Breast pump (Patient not taking: Reported on 2018), Disp: 1 each, Rfl: 0      Objective:  Physical Exam:   Breast:  Benign exam, no masses palpated.  No skin changes, no axillary lymphadenopathy, no nipple discharge.  Axilla feel completely normal, no lymph node enlargement and non-tender.  Heart=RRR, no murmurs  Thyroid=normal, no masses, no TTP  Lungs=Clear to ascultation bilateral, non-labored breathing  Abd=soft, Nontender/nondistended, +bowel sounds x4  PELVIC:    External genitalia: normal without lesion  Vagina: normal mucosa and rugae, no discharge.  Cervix: normal without lesion, healthy, GC and Chlamydia obtained  Uterus: small, mobile, nontender.  Adnexa: non tender, without masses  Rectal: deffered  Ext=no clubbing or cyanosis  ZJNr=[id number]    Assessment:   1.  IUP at 11 1/7 weeks here for her initial OB visit    Plan:    1.  PNV  2.  NOB Labs   3.  Discussed routine prenatal care, quad screen, GCT, anatomy scan at ~19 weeks, frequency of visits.  4.  Discussed first trimester screen and she will think about it  5.  [place] [facility] [place]  6.  RTC 4 weeks.  7.  FOB sister with Downs=considering genetic testing  8.  History of c section=unsure about   9.  Hashimoto's=pt sees Endocrine. Will check TSH today    Discussed physician coverage, tertiary support, diet, exercise, weight gain, schedule of visits, routine and indicated ultrasounds, and childbirth education.    Options for  testing for chromosomal and birth defects were discussed with the patient. Diagnostic tests include CVS and Amniocentesis. We discussed that these tests are definitive but invasive and do carry a risk of fetal loss.   Screening tests include nuchal translucency/blood marker testing in the first trimester and quad screening in the second trimester. We discussed that these are  screening tests and not diagnostic tests and that false positives and negatives are a distinct possibility.     25 minutes was spent face to face with the patient today discussing her history, diagnosis, and follow-up plan as noted above.  Over 50% of the visit was spent in counseling and coordination of care.    Total Visit Time: 30 minutes      Hamida Cabezas

## 2018-11-07 LAB
C TRACH DNA SPEC QL NAA+PROBE: NEGATIVE
HBV SURFACE AG SERPL QL IA: NONREACTIVE
HIV 1+2 AB+HIV1 P24 AG SERPL QL IA: NONREACTIVE
N GONORRHOEA DNA SPEC QL NAA+PROBE: NEGATIVE
RUBV IGG SERPL IA-ACNC: 83 IU/ML
SPECIMEN SOURCE: NORMAL
SPECIMEN SOURCE: NORMAL
T PALLIDUM AB SER QL: NONREACTIVE

## 2018-12-04 ENCOUNTER — PRENATAL OFFICE VISIT (OUTPATIENT)
Dept: OBGYN | Facility: CLINIC | Age: 33
End: 2018-12-04
Payer: COMMERCIAL

## 2018-12-04 VITALS
BODY MASS INDEX: 29.81 KG/M2 | DIASTOLIC BLOOD PRESSURE: 60 MMHG | HEART RATE: 77 BPM | WEIGHT: 174.6 LBS | SYSTOLIC BLOOD PRESSURE: 102 MMHG

## 2018-12-04 DIAGNOSIS — Z87.59 HISTORY OF GESTATIONAL HYPERTENSION: ICD-10-CM

## 2018-12-04 DIAGNOSIS — E03.9 HYPOTHYROIDISM, UNSPECIFIED TYPE: ICD-10-CM

## 2018-12-04 DIAGNOSIS — Z34.92 NORMAL PREGNANCY IN SECOND TRIMESTER: Primary | ICD-10-CM

## 2018-12-04 DIAGNOSIS — Z98.891 H/O: C-SECTION: ICD-10-CM

## 2018-12-04 PROCEDURE — 99207 ZZC PRENATAL VISIT: CPT | Performed by: OBSTETRICS & GYNECOLOGY

## 2018-12-04 NOTE — NURSING NOTE
"Chief Complaint   Patient presents with     Prenatal Care       Initial /60 (BP Location: Right arm, Patient Position: Chair, Cuff Size: Adult Regular)  Pulse 77  Wt 174 lb 9.6 oz (79.2 kg)  LMP 2018  BMI 29.81 kg/m2 Estimated body mass index is 29.81 kg/(m^2) as calculated from the following:    Height as of 18: 5' 4.17\" (1.63 m).    Weight as of this encounter: 174 lb 9.6 oz (79.2 kg).  BP completed using cuff size: regular        Nikki Meadows, STEPHIE  2018          "

## 2018-12-04 NOTE — PROGRESS NOTES
33 year old G2P at 15w1d weeks presents to the clinic for a routine prenatal visit.  Feeling well.  No vaginal bleeding, leakage of fluid, or contractions   Fundal height=s=d  EAMl=785  Discussed quad screen and she declines  Will schedule anatomy ultrasound.  Anxiety=stable  Hypothyroidism=TSH=1.32 in Nov. Patient wishes recheck today  We discussed  vs RCS.  She is still unsure  RTC 4 weeks.    Hamida Cabezas

## 2018-12-04 NOTE — MR AVS SNAPSHOT
After Visit Summary   2018    Teresita Hutton    MRN: [id number]           Patient Information     Date Of Birth          1985        Visit Information        Provider Department      2018 11:00 AM Hamida Cabezas [place] [facility] [place]        Today's Diagnoses     Normal pregnancy in second trimester    -  1    Hypothyroidism, unspecified type        History of gestational hypertension        H/O:           Care Instructions    Prenatal Care  What is prenatal care?   Prenatal care is the care you receive when you are pregnant. It includes care given by your healthcare provider, support from your family, and an extra focus on giving yourself the care you need during this special time. Prenatal care improves your chances for a healthy pregnancy and healthy baby.   When should I see my healthcare provider?   Good care during pregnancy includes regularly scheduled prenatal exams.   If you are not yet pregnant but planning to get pregnant in the next few months, see your provider. Your provider may do some tests and talk about things you can do to have a healthy pregnancy and healthy baby.   You should schedule your first prenatal visit with your provider as soon as you think or know you are pregnant. Depending on your health and health history, your provider will probably schedule visits at least once a month for the first 6 months. During the 7th and 8th months you will see your provider every 2 weeks. During the last month you will probably see your provider once a week until you deliver your baby. If your pregnancy is high risk, your provider will probably want to see you more often. In some cases your provider may refer you to a medical specialist for more help with special needs, such as diabetes.   At each visit your healthcare provider will check to make sure that you and the baby are healthy. Regular visits can help you and your provider prevent possible  problems. They can also help your provider find and treat any problems early. In addition to meeting your medical needs, your provider advise you about caring for yourself. You will talk about how to have a healthy diet and get plenty of exercise and rest. Your provider can also help you deal with the emotional changes that can happen during pregnancy.   What will happen at the first prenatal visit?   At your first visit, your provider will ask about your personal medical history. He or she will also ask about the baby's father and your family health history. This information can help give your provider an idea of any problems you might have during your pregnancy. You will have a physical exam, including checks of your height, weight, and blood pressure and a pelvic exam. You will have a Pap test, urine tests, blood tests, and cultures of the cervix and vagina to check for infection.   Your provider will calculate your due date and the age of your baby. If your periods were regular before you got pregnant, and you are sure of the day when your last period started, your due date will be estimated to be 40 weeks from that day.   Your provider will talk to you about how to stay healthy during your pregnancy.   What will happen at other prenatal visits?   Your provider will check how you are doing and how the baby is developing. He or she will discuss how you are feeling, ask if you have any problems, and answer your questions.   During each prenatal visit your provider will:   weigh you   take your blood pressure   check your urine for sugar, protein, or bacteria   check your face, hands, ankles, and feet for swelling   listen to the baby's heartbeat   measure the size of the uterus to check the baby's growth.   At different times during the pregnancy, other exams and tests may be done. Some are routine and others are done only when a problem is suspected or you have a risk factor for a problem. Examples of other tests  you might have are:   tests to check for genetic problems and some birth defects, such as:   chorionic villus sampling of cells from the placenta   amniocentesis to test the fluid around the baby   blood tests called triple or quad screens   ultrasound scans to check the baby's growth, development, and health and to look at your uterus, the amniotic sac, and the placenta   blood tests to check for diabetes   electronic monitoring to check the health of the baby.   How can I take care of myself during my pregnancy?   Here are some things you can do to take good care of yourself during your pregnancy and prepare for the birth of your child:   Keep all appointments with your healthcare provider. Use these visits to discuss your pregnancy concerns or problems. Write down questions before each visit so that you will not forget about things you want to talk about.   Eat healthy meals that include whole grains, fresh fruits and vegetables, and calcium-rich foods, such as milk, cheese, and yogurt. Choose foods low in saturated fat. Do not eat uncooked or undercooked meats or fish.   Avoid certain fish with high levels of mercury. These fish include shark, julia mackerel, swordfish, and tilefish. Do not eat more than 12 ounces of fish per week, or no more than 6 ounces of tuna fish per week. Because albacore tuna fish is also high in mercury, choose light tuna.   Drink plenty of water each day.   Take vitamins, other supplements, and medicines as recommended by your provider.   Unless your healthcare provider tells you not to, try to be physically active for at least 30 minutes a day, most days of the week. If you are pressed for time, you might find it easier to exercise 10 minutes at a time, 3 times a day. Consider taking a prenatal exercise class.   Do not smoke, do not drink alcohol, and do not take illegal drugs.   Talk to your healthcare provider before you take any medicine, including nonprescription and herbal  medicines. Some medicines are not safe during pregnancy.   Avoid hot tubs or saunas.   If you have cats in your home, do not empty the cat litter while you are pregnant. It may contain a parasite that causes an infection called toxoplasmosis, which can cause birth defects. Also, use gloves when you work in garden areas used by cats.   Stay away from toxic chemicals like insecticides, solvents (such as some  or paint thinners), lead, and mercury. Check labels on household products. Most dangerous products have pregnancy warnings on their labels. Ask your healthcare provider about products if you are unsure.   Relax by taking breaks from work or chores.   Help reduce stress by sharing your feelings with others.   Report any violence or other types of abuse in your home.   Learn more about pregnancy, labor, and delivery. Read books, watch videos, go to a childbirth class, and talk with experienced moms.   Plan for the lifestyle changes a new baby will bring. Prepare for possible changes in your budget, work situation, daily schedule, and relationships with family and friends.   Talk to your provider about the pros and cons of breast-feeding.   Before and during your pregnancy, try to do everything you can to keep yourself and your baby healthy during your pregnancy.     Published by [employer].  This content is reviewed periodically and is subject to change as new health information becomes available. The information is intended to inform and educate and is not a replacement for medical evaluation, advice, diagnosis or treatment by a healthcare professional.   Developed by [employer].   ? 2010 [facility] and/or its affiliates. All Rights Reserved.   Copyright   [employer] 2011              Follow-ups after your visit        Follow-up notes from your care team     Return in about 4 weeks (around 1/1/2019).      Your next 10 appointments already scheduled     Jan 08, 2019  9:15 AM CALLY Mattson  OB-GYN Established Prenatal with Hamida Cabezas, DO   [facility] [place]    [address] [address] [place] [id number] [phone]              Future tests that were ordered for you today     Open Future Orders        Priority Expected Expires Ordered    US OB > 14 Weeks Routine  3/4/2019 12/4/2018            Who to contact     If you have questions or need follow up information about today's clinic visit or your schedule please contact [facility] directly at [phone].  Normal or non-critical lab and imaging results will be communicated to you by Tele[employer], letter or phone within 4 business days after the clinic has received the results. If you do not hear from us within 7 days, please contact the clinic through Tele[employer] or phone. If you have a critical or abnormal lab result, we will notify you by phone as soon as possible.  Submit refill requests through [employer] or call your pharmacy and they will forward the refill request to us. Please allow 3 business days for your refill to be completed.          Additional Information About Your Visit        Tele[employer]hart Information     [employer] gives you secure access to your electronic health record. If you see a primary care provider, you can also send messages to your care team and make appointments. If you have questions, please call your primary care clinic.  If you do not have a primary care provider, please call [phone] and they will assist you.        Care EveryWhere ID     This is your Care EveryWhere ID. This could be used by other organizations to access your [facility] medical records  [id number]        Your Vitals Were     Pulse Last Period BMI (Body Mass Index)             77 08/20/2018 29.81 kg/m2          Blood Pressure from Last 3 Encounters:   12/04/18 102/60   11/06/18 104/63   11/14/17 122/78    Weight from Last 3 Encounters:   12/04/18 174 lb 9.6 oz (79.2 kg)   11/06/18 171 lb 3.2  oz (77.7 kg)   09/26/18 167 lb 8 oz (76 kg)              We Performed the Following     Comprehensive metabolic panel     Protein  random urine with Creat Ratio     TSH        Primary Care Provider Office Phone # Fax #    Nikki Castro [phone]       [address] [facility] [address]        Equal Access to Services     BRYNN ROSS : Hadii aad ku hadasho Soomaali, waaxda luqadaha, qaybta kaalmada adeegyada, waxay idiin hayaan adeeg kharash laAnantaan . So [facility] [phone].    ATENCIÓN: Si habla español, tiene a sterling disposición servicios gratuitos de asistencia lingüística. Llame al [phone].    We comply with applicable federal civil rights laws and [place] laws. We do not discriminate on the basis of race, color, national origin, age, disability, sex, sexual orientation, or gender identity.            Thank you!     Thank you for choosing [facility]  for your care. Our goal is always to provide you with excellent care. Hearing back from our patients is one way we can continue to improve our services. Please take a few minutes to complete the written survey that you may receive in the mail after your visit with us. Thank you!             Your Updated Medication List - Protect others around you: Learn how to safely use, store and throw away your medicines at www.disposemymeds.org.          This list is accurate as of 12/4/18 11:25 AM.  Always use your most recent med list.                   Brand Name Dispense Instructions for use Diagnosis    DHA PO       Normal pregnancy in second trimester, Hypothyroidism, unspecified type, History of gestational hypertension       levothyroxine 100 MCG tablet    SYNTHROID/LEVOTHROID     TAKE 1 TABLET (100 MCG) BY MOUTH ONCE DAILY.        order for DME     1 each    Breast pump    Encounter for supervision of other normal pregnancy in second trimester, Rh negative status in sears pregnancy in second trimester       PRENATAL 1 PO       Take 1 tablet by mouth daily        vitamin D2 25273 units (1250 mcg) capsule    ERGOCALCIFEROL     TAKE 1 CAPSULE (50,000 UNITS) BY MOUTH EVERY 7 (SEVEN) DAYS.

## 2018-12-04 NOTE — PATIENT INSTRUCTIONS
Prenatal Care  What is prenatal care?   Prenatal care is the care you receive when you are pregnant. It includes care given by your healthcare provider, support from your family, and an extra focus on giving yourself the care you need during this special time. Prenatal care improves your chances for a healthy pregnancy and healthy baby.   When should I see my healthcare provider?   Good care during pregnancy includes regularly scheduled prenatal exams.   If you are not yet pregnant but planning to get pregnant in the next few months, see your provider. Your provider may do some tests and talk about things you can do to have a healthy pregnancy and healthy baby.   You should schedule your first prenatal visit with your provider as soon as you think or know you are pregnant. Depending on your health and health history, your provider will probably schedule visits at least once a month for the first 6 months. During the 7th and 8th months you will see your provider every 2 weeks. During the last month you will probably see your provider once a week until you deliver your baby. If your pregnancy is high risk, your provider will probably want to see you more often. In some cases your provider may refer you to a medical specialist for more help with special needs, such as diabetes.   At each visit your healthcare provider will check to make sure that you and the baby are healthy. Regular visits can help you and your provider prevent possible problems. They can also help your provider find and treat any problems early. In addition to meeting your medical needs, your provider advise you about caring for yourself. You will talk about how to have a healthy diet and get plenty of exercise and rest. Your provider can also help you deal with the emotional changes that can happen during pregnancy.   What will happen at the first prenatal visit?   At your first visit, your provider will ask about your personal medical history. He  or she will also ask about the baby's father and your family health history. This information can help give your provider an idea of any problems you might have during your pregnancy. You will have a physical exam, including checks of your height, weight, and blood pressure and a pelvic exam. You will have a Pap test, urine tests, blood tests, and cultures of the cervix and vagina to check for infection.   Your provider will calculate your due date and the age of your baby. If your periods were regular before you got pregnant, and you are sure of the day when your last period started, your due date will be estimated to be 40 weeks from that day.   Your provider will talk to you about how to stay healthy during your pregnancy.   What will happen at other prenatal visits?   Your provider will check how you are doing and how the baby is developing. He or she will discuss how you are feeling, ask if you have any problems, and answer your questions.   During each prenatal visit your provider will:   weigh you   take your blood pressure   check your urine for sugar, protein, or bacteria   check your face, hands, ankles, and feet for swelling   listen to the baby's heartbeat   measure the size of the uterus to check the baby's growth.   At different times during the pregnancy, other exams and tests may be done. Some are routine and others are done only when a problem is suspected or you have a risk factor for a problem. Examples of other tests you might have are:   tests to check for genetic problems and some birth defects, such as:   chorionic villus sampling of cells from the placenta   amniocentesis to test the fluid around the baby   blood tests called triple or quad screens   ultrasound scans to check the baby's growth, development, and health and to look at your uterus, the amniotic sac, and the placenta   blood tests to check for diabetes   electronic monitoring to check the health of the baby.   How can I take care  of myself during my pregnancy?   Here are some things you can do to take good care of yourself during your pregnancy and prepare for the birth of your child:   Keep all appointments with your healthcare provider. Use these visits to discuss your pregnancy concerns or problems. Write down questions before each visit so that you will not forget about things you want to talk about.   Eat healthy meals that include whole grains, fresh fruits and vegetables, and calcium-rich foods, such as milk, cheese, and yogurt. Choose foods low in saturated fat. Do not eat uncooked or undercooked meats or fish.   Avoid certain fish with high levels of mercury. These fish include shark, julia mackerel, swordfish, and tilefish. Do not eat more than 12 ounces of fish per week, or no more than 6 ounces of tuna fish per week. Because albacore tuna fish is also high in mercury, choose light tuna.   Drink plenty of water each day.   Take vitamins, other supplements, and medicines as recommended by your provider.   Unless your healthcare provider tells you not to, try to be physically active for at least 30 minutes a day, most days of the week. If you are pressed for time, you might find it easier to exercise 10 minutes at a time, 3 times a day. Consider taking a prenatal exercise class.   Do not smoke, do not drink alcohol, and do not take illegal drugs.   Talk to your healthcare provider before you take any medicine, including nonprescription and herbal medicines. Some medicines are not safe during pregnancy.   Avoid hot tubs or saunas.   If you have cats in your home, do not empty the cat litter while you are pregnant. It may contain a parasite that causes an infection called toxoplasmosis, which can cause birth defects. Also, use gloves when you work in garden areas used by cats.   Stay away from toxic chemicals like insecticides, solvents (such as some  or paint thinners), lead, and mercury. Check labels on household products.  Most dangerous products have pregnancy warnings on their labels. Ask your healthcare provider about products if you are unsure.   Relax by taking breaks from work or chores.   Help reduce stress by sharing your feelings with others.   Report any violence or other types of abuse in your home.   Learn more about pregnancy, labor, and delivery. Read books, watch videos, go to a childbirth class, and talk with experienced moms.   Plan for the lifestyle changes a new baby will bring. Prepare for possible changes in your budget, work situation, daily schedule, and relationships with family and friends.   Talk to your provider about the pros and cons of breast-feeding.   Before and during your pregnancy, try to do everything you can to keep yourself and your baby healthy during your pregnancy.     Published by [employer].  This content is reviewed periodically and is subject to change as new health information becomes available. The information is intended to inform and educate and is not a replacement for medical evaluation, advice, diagnosis or treatment by a healthcare professional.   Developed by [employer].   ? 2010 [employer] and/or its affiliates. All Rights Reserved.   Copyright   [employer] 2011

## 2018-12-11 ENCOUNTER — MYC MEDICAL ADVICE (OUTPATIENT)
Dept: OBGYN | Facility: CLINIC | Age: 33
End: 2018-12-11

## 2018-12-11 NOTE — LETTER
[address] [address]  Phone: [phone]    12/12/18    Teresita Hutton  [address] [place] [address]      To whom it may concern:     Teresita Hutton is currently under my professional care for pregnancy.  Teresita has an estimated conception date of 9/5/18 and an estimated due date of 5/27/19.  Please contact our office if you have any questions.    Sincerely,      Hamida Cabezas, DO

## 2019-01-08 ENCOUNTER — PRENATAL OFFICE VISIT (OUTPATIENT)
Dept: OBGYN | Facility: CLINIC | Age: 34
End: 2019-01-08
Payer: COMMERCIAL

## 2019-01-08 ENCOUNTER — ANCILLARY PROCEDURE (OUTPATIENT)
Dept: ULTRASOUND IMAGING | Facility: CLINIC | Age: 34
End: 2019-01-08
Attending: OBSTETRICS & GYNECOLOGY
Payer: COMMERCIAL

## 2019-01-08 VITALS
TEMPERATURE: 97.5 F | DIASTOLIC BLOOD PRESSURE: 65 MMHG | OXYGEN SATURATION: 100 % | SYSTOLIC BLOOD PRESSURE: 101 MMHG | WEIGHT: 183.9 LBS | BODY MASS INDEX: 31.4 KG/M2 | HEART RATE: 72 BPM

## 2019-01-08 DIAGNOSIS — O26.899 RH NEGATIVE STATE IN ANTEPARTUM PERIOD: ICD-10-CM

## 2019-01-08 DIAGNOSIS — Z87.59 HISTORY OF GESTATIONAL HYPERTENSION: ICD-10-CM

## 2019-01-08 DIAGNOSIS — E06.3 HASHIMOTO'S THYROIDITIS: ICD-10-CM

## 2019-01-08 DIAGNOSIS — E03.9 HYPOTHYROIDISM, UNSPECIFIED TYPE: ICD-10-CM

## 2019-01-08 DIAGNOSIS — Z34.92 NORMAL PREGNANCY IN SECOND TRIMESTER: ICD-10-CM

## 2019-01-08 DIAGNOSIS — Z34.92 NORMAL PREGNANCY IN SECOND TRIMESTER: Primary | ICD-10-CM

## 2019-01-08 DIAGNOSIS — E06.3 HASHIMOTO'S THYROIDITIS: Primary | ICD-10-CM

## 2019-01-08 DIAGNOSIS — Z67.91 RH NEGATIVE STATE IN ANTEPARTUM PERIOD: ICD-10-CM

## 2019-01-08 LAB
ALBUMIN SERPL-MCNC: 3.2 G/DL (ref 3.4–5)
ALP SERPL-CCNC: 80 U/L (ref 40–150)
ALT SERPL W P-5'-P-CCNC: 10 U/L (ref 0–50)
ANION GAP SERPL CALCULATED.3IONS-SCNC: 6 MMOL/L (ref 3–14)
AST SERPL W P-5'-P-CCNC: 13 U/L (ref 0–45)
BILIRUB SERPL-MCNC: 0.2 MG/DL (ref 0.2–1.3)
BUN SERPL-MCNC: 7 MG/DL (ref 7–30)
CALCIUM SERPL-MCNC: 8.7 MG/DL (ref 8.5–10.1)
CHLORIDE SERPL-SCNC: 106 MMOL/L (ref 94–109)
CO2 SERPL-SCNC: 27 MMOL/L (ref 20–32)
CREAT SERPL-MCNC: 0.55 MG/DL (ref 0.52–1.04)
CREAT UR-MCNC: 49 MG/DL
GFR SERPL CREATININE-BSD FRML MDRD: >90 ML/MIN/{1.73_M2}
GLUCOSE SERPL-MCNC: 80 MG/DL (ref 70–99)
POTASSIUM SERPL-SCNC: 3.8 MMOL/L (ref 3.4–5.3)
PROT SERPL-MCNC: 7.1 G/DL (ref 6.8–8.8)
PROT UR-MCNC: 0.1 G/L
PROT/CREAT 24H UR: 0.2 G/G CR (ref 0–0.2)
SODIUM SERPL-SCNC: 139 MMOL/L (ref 133–144)
TSH SERPL DL<=0.005 MIU/L-ACNC: 1.36 MU/L (ref 0.4–4)

## 2019-01-08 PROCEDURE — 84443 ASSAY THYROID STIM HORMONE: CPT | Performed by: OBSTETRICS & GYNECOLOGY

## 2019-01-08 PROCEDURE — 80053 COMPREHEN METABOLIC PANEL: CPT | Performed by: OBSTETRICS & GYNECOLOGY

## 2019-01-08 PROCEDURE — 99207 ZZC PRENATAL VISIT: CPT | Performed by: OBSTETRICS & GYNECOLOGY

## 2019-01-08 PROCEDURE — 36415 COLL VENOUS BLD VENIPUNCTURE: CPT | Performed by: OBSTETRICS & GYNECOLOGY

## 2019-01-08 PROCEDURE — 76805 OB US >/= 14 WKS SNGL FETUS: CPT | Performed by: RADIOLOGY

## 2019-01-08 PROCEDURE — 84156 ASSAY OF PROTEIN URINE: CPT | Performed by: OBSTETRICS & GYNECOLOGY

## 2019-01-08 RX ORDER — LEVOTHYROXINE SODIUM 100 UG/1
100 TABLET ORAL DAILY
Qty: 90 TABLET | Refills: 1 | Status: SHIPPED | OUTPATIENT
Start: 2019-01-08 | End: 2020-01-08

## 2019-01-08 NOTE — PATIENT INSTRUCTIONS
What to watch out for are: regular contractions every 5 min, vaginal bleeding, decreased fetal movement, or leakage of fluid.  Please call the office or go to L&D if you develop any of these signs and symptoms.      I will see you for your follow up appointment.  Please feel free to call if you have any questions or concerns.      Thanks,  Hamida Cabezas, DO

## 2019-01-08 NOTE — PROGRESS NOTES
33 year old  at 20w1d weeks presents to the clinic for a routine prenatal visit.  No concerns.  No leakage of fluid, vaginal bleeding, or contractions   Good fetal movement.  FHTs: 140  Fundal height: 20cm  Normal anatomy ultrasound  Hypothyroidism=TSH today.  Needs refill  RTC 4 weeks    Hamida Cabezas

## 2019-02-05 ENCOUNTER — PRENATAL OFFICE VISIT (OUTPATIENT)
Dept: OBGYN | Facility: CLINIC | Age: 34
End: 2019-02-05
Payer: COMMERCIAL

## 2019-02-05 ENCOUNTER — MYC MEDICAL ADVICE (OUTPATIENT)
Dept: OBGYN | Facility: CLINIC | Age: 34
End: 2019-02-05

## 2019-02-05 VITALS
OXYGEN SATURATION: 100 % | WEIGHT: 191.3 LBS | DIASTOLIC BLOOD PRESSURE: 69 MMHG | HEART RATE: 80 BPM | SYSTOLIC BLOOD PRESSURE: 108 MMHG | BODY MASS INDEX: 32.66 KG/M2 | TEMPERATURE: 97.6 F

## 2019-02-05 DIAGNOSIS — Z67.91 RH NEGATIVE STATE IN ANTEPARTUM PERIOD: ICD-10-CM

## 2019-02-05 DIAGNOSIS — E06.3 HASHIMOTO'S THYROIDITIS: ICD-10-CM

## 2019-02-05 DIAGNOSIS — Z34.92 NORMAL PREGNANCY IN SECOND TRIMESTER: Primary | ICD-10-CM

## 2019-02-05 DIAGNOSIS — F43.22 ADJUSTMENT DISORDER WITH ANXIETY: ICD-10-CM

## 2019-02-05 DIAGNOSIS — Z34.92 NORMAL PREGNANCY IN SECOND TRIMESTER: ICD-10-CM

## 2019-02-05 DIAGNOSIS — O26.899 RH NEGATIVE STATE IN ANTEPARTUM PERIOD: ICD-10-CM

## 2019-02-05 DIAGNOSIS — O34.219 DESIRES VBAC (VAGINAL BIRTH AFTER CESAREAN) TRIAL: ICD-10-CM

## 2019-02-05 DIAGNOSIS — Z98.891 H/O: C-SECTION: ICD-10-CM

## 2019-02-05 LAB
GLUCOSE 1H P 50 G GLC PO SERPL-MCNC: 117 MG/DL (ref 60–129)
HGB BLD-MCNC: 12.3 G/DL (ref 11.7–15.7)

## 2019-02-05 PROCEDURE — 99207 ZZC PRENATAL VISIT: CPT | Performed by: OBSTETRICS & GYNECOLOGY

## 2019-02-05 PROCEDURE — 82950 GLUCOSE TEST: CPT | Performed by: OBSTETRICS & GYNECOLOGY

## 2019-02-05 PROCEDURE — 36415 COLL VENOUS BLD VENIPUNCTURE: CPT | Performed by: OBSTETRICS & GYNECOLOGY

## 2019-02-05 PROCEDURE — 00000218 ZZHCL STATISTIC OBHBG - HEMOGLOBIN: Performed by: OBSTETRICS & GYNECOLOGY

## 2019-02-05 NOTE — LETTER
February 5, 2019      Teresita Hutton  [address] [facility] [address]      To Whom it May Concern,         Teresita Hutton was seen at our clinic.  Her estimated conception date was 9/4/18 and her estimated due date is 5/27/19.  Feel free to contact me with any questions or concerns.        Sincerely,        Hamida Cabezas, DO

## 2019-02-05 NOTE — PROGRESS NOTES
33 year old  at 24w1d weeks presents to the clinic for a routine prenatal visit.  No concerns.  No vaginal bleeding, leakage of fluid, or contractions   Good fetal movement.  FHTs= 155  Fundal height=24cm  Normal anatomy ultrasound  RTC 4 weeks  GCT today  Hypothyroidism=1.36   Anxiety=stable  Blood type:O-  Rhogam next visit    Hamida Cabezas

## 2019-09-24 DIAGNOSIS — E06.3 HASHIMOTO'S THYROIDITIS: ICD-10-CM

## 2019-09-24 NOTE — TELEPHONE ENCOUNTER
"Requested Prescriptions   Pending Prescriptions Disp Refills     levothyroxine (SYNTHROID/LEVOTHROID) 100 MCG tablet 90 tablet 1     Sig: Take 1 tablet (100 mcg) by mouth daily       Thyroid Protocol Failed - 9/24/2019  8:32 AM        Failed - Recent (12 mo) or future (30 days) visit within the authorizing provider's specialty     Patient had office visit in the last 12 months or has a visit in the next 30 days with authorizing provider or within the authorizing provider's specialty.  See \"Patient Info\" tab in inbasket, or \"Choose Columns\" in Meds & Orders section of the refill encounter.              Failed - No positive pregnancy test in past 12 months     If patient is pregnant or has had a positive pregnancy test, please check TSH.          Passed - Patient is 12 years or older        Passed - Medication is active on med list        Passed - Normal TSH on file in past 12 months     Recent Labs   Lab Test 01/08/19  0938   TSH 1.36              Passed - No active pregnancy on record     If patient is pregnant or has had a positive pregnancy test, please check TSH.            Unable to reach patient via phone. Left message to call clinic back at [phone] and ask for Women's Health.    Writer wanting to speak with pt.   Will send [employer].   Will wait to fill till  Writer hears back from pt.     Chhaya Still RN on 9/24/2019 at 8:52 AM      "

## 2019-09-26 NOTE — TELEPHONE ENCOUNTER
Unable to reach patient via phone. Left message to call clinic back at [phone] and ask for Women's Health.    Chhaya Still RN on 9/26/2019 at 9:33 AM

## 2019-09-27 RX ORDER — LEVOTHYROXINE SODIUM 100 UG/1
100 TABLET ORAL DAILY
Qty: 90 TABLET | Refills: 1 | OUTPATIENT
Start: 2019-09-27

## 2019-09-27 NOTE — TELEPHONE ENCOUNTER
Unable to reach patient via phone. Left message to call clinic back at [phone] and ask for Women's Health.    Will close encounter left three messages.     Chhaya Still RN on 9/27/2019 at 9:01 AM

## 2019-09-28 ENCOUNTER — HEALTH MAINTENANCE LETTER (OUTPATIENT)
Age: 34
End: 2019-09-28

## 2020-03-15 ENCOUNTER — HEALTH MAINTENANCE LETTER (OUTPATIENT)
Age: 35
End: 2020-03-15

## 2021-01-09 ENCOUNTER — HEALTH MAINTENANCE LETTER (OUTPATIENT)
Age: 36
End: 2021-01-09

## 2021-10-23 ENCOUNTER — HEALTH MAINTENANCE LETTER (OUTPATIENT)
Age: 36
End: 2021-10-23

## 2022-02-12 ENCOUNTER — HEALTH MAINTENANCE LETTER (OUTPATIENT)
Age: 37
End: 2022-02-12

## 2022-10-09 ENCOUNTER — HEALTH MAINTENANCE LETTER (OUTPATIENT)
Age: 37
End: 2022-10-09

## 2023-02-18 ENCOUNTER — HEALTH MAINTENANCE LETTER (OUTPATIENT)
Age: 38
End: 2023-02-18

## 2024-03-16 ENCOUNTER — HEALTH MAINTENANCE LETTER (OUTPATIENT)
Age: 39
End: 2024-03-16

## 2024-04-12 NOTE — PROGRESS NOTES
Feeling well.  Has had a little low back and foot pain -- discussed conservative management techniques and offered PT  Also discussed delivery -- called insurance and needs referral to [facility].  Discussed that this means that it may likely be tier 2, in which case she may have more costs with out of pocket maximums, etc applied.  Asked to double check and offered that we will only be present at [place] as well if that means anything to her.    Good fetal movement.  No VB, LOF.  No contractions.  Discussed labor precautions.  RTC 2 weeks.  Nikki Castro MD      1.88